# Patient Record
Sex: MALE | Race: WHITE | Employment: FULL TIME | URBAN - METROPOLITAN AREA
[De-identification: names, ages, dates, MRNs, and addresses within clinical notes are randomized per-mention and may not be internally consistent; named-entity substitution may affect disease eponyms.]

---

## 2017-01-04 ENCOUNTER — OFFICE VISIT (OUTPATIENT)
Dept: SURGERY | Age: 32
End: 2017-01-04

## 2017-01-04 ENCOUNTER — TELEPHONE (OUTPATIENT)
Dept: SURGERY | Age: 32
End: 2017-01-04

## 2017-01-04 VITALS
DIASTOLIC BLOOD PRESSURE: 66 MMHG | OXYGEN SATURATION: 98 % | TEMPERATURE: 98.4 F | RESPIRATION RATE: 14 BRPM | HEART RATE: 86 BPM | WEIGHT: 180 LBS | HEIGHT: 66 IN | BODY MASS INDEX: 28.93 KG/M2 | SYSTOLIC BLOOD PRESSURE: 124 MMHG

## 2017-01-04 DIAGNOSIS — R11.2 NAUSEA AND VOMITING, INTRACTABILITY OF VOMITING NOT SPECIFIED, UNSPECIFIED VOMITING TYPE: ICD-10-CM

## 2017-01-04 DIAGNOSIS — Z09 POSTOPERATIVE EXAMINATION: Primary | ICD-10-CM

## 2017-01-04 RX ORDER — ONDANSETRON 4 MG/1
4 TABLET, ORALLY DISINTEGRATING ORAL
Qty: 20 TAB | Refills: 0 | OUTPATIENT
Start: 2017-01-04 | End: 2019-11-21

## 2017-01-04 NOTE — PROGRESS NOTES
1. Have you been to the ER, urgent care clinic since your last visit? Hospitalized since your last visit?no    2. Have you seen or consulted any other health care providers outside of the [address] [place] [address] since your last visit? Include any pap smears or colon screening.  no

## 2017-01-04 NOTE — PROGRESS NOTES
Subjective:      Kassie Horan is a 27 y.o. male presents for postop care following bilateral inguinal hernia repair on 12/7/2016. Appetite is good. Eating a regular diet without difficulty. Bowel movements are regular. The patient is voiding without difficulty. 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 He got nauseated and vomited in front of his boss. He states that the pain started after he vomited. He complains of soreness on his right side. His boss wanted him to come in to be seen. His nausea and pain is much better now. Patient has an advanced directive: NO      Mr. Kareen De La Vega has a reminder for a \"due or due soon\" health maintenance. I have asked that he contact his primary care provider for follow-up on this health maintenance. Objective:     Visit Vitals    /66 (BP 1 Location: Left arm, BP Patient Position: Sitting)    Pulse 86    Temp 98.4 °F (36.9 °C) (Oral)    Resp 14    Ht 5' 6\" (1.676 m)    Wt 180 lb (81.6 kg)    SpO2 98%    BMI 29.05 kg/m2       General:  alert, cooperative, no distress   Abdomen: soft, bowel sounds active, non-tender   Incision:   healing well, no drainage, no erythema, no hernia, no seroma, no swelling, no dehiscence, incision well approximated     Assessment:     Nausea, vomitng and right groin pain. Plan:     Letter given to RTW 1/19/2017 without restrictions. Rest.   Follow up as needed.

## 2017-01-04 NOTE — MR AVS SNAPSHOT
Visit Information Date & Time Provider Department Dept. Phone Encounter #  
 1/4/2017 10:10 AM Matheus Green, [address] [facility] [phone] [id number] Upcoming Health Maintenance Date Due Pneumococcal 19-64 Medium Risk (1 of 1 - PPSV23) 10/11/2003 DTaP/Tdap/Td series (1 - Tdap) 10/11/2005 INFLUENZA AGE 9 TO ADULT 8/1/2016 Allergies as of 1/4/2017  Review Complete On: 1/4/2017 By: Matheus Green NP Severity Noted Reaction Type Reactions Toradol [Ketorolac Tromethamine] Low 12/06/2016   Intolerance Other (comments)  
 gastric reflux Current Immunizations  Never Reviewed No immunizations on file. Not reviewed this visit Vitals BP Pulse Temp Resp Height(growth percentile) Weight(growth percentile) 124/66 (BP 1 Location: Left arm, BP Patient Position: Sitting) 86 98.4 °F (36.9 °C) (Oral) 14 5' 6\" (1.676 m) 180 lb (81.6 kg) SpO2 BMI Smoking Status 98% 29.05 kg/m2 Current Some Day Smoker BMI and BSA Data Body Mass Index Body Surface Area 29.05 kg/m 2 1.95 m 2 Preferred Pharmacy Pharmacy Name Phone [facility] [address] [place] [address] [phone] Your Updated Medication List  
  
   
This list is accurate as of: 1/4/17  1:55 PM.  Always use your most recent med list.  
  
  
  
  
 ADDERALL 30 mg tablet Generic drug:  dextroamphetamine-amphetamine Take 10 mg by mouth daily. HYDROcodone-acetaminophen 5-325 mg per tablet Commonly known as:  NORCO  
1 to 2 tablets every 4 hours as needed for pain  
  
 ibuprofen 600 mg tablet Commonly known as:  MOTRIN Take 1 Tab by mouth every six (6) hours as needed for Pain. omeprazole 20 mg capsule Commonly known as:  PRILOSEC Take 20 mg by mouth daily. ondansetron 4 mg disintegrating tablet Commonly known as:  ZOFRAN ODT Take 1 Tab by mouth every eight (8) hours as needed for Nausea. Prescriptions Sent to Pharmacy Refills  
 ondansetron (ZOFRAN ODT) 4 mg disintegrating tablet 0 Sig: Take 1 Tab by mouth every eight (8) hours as needed for Nausea. Class: Normal  
 Pharmacy: [address] [facility] [place]  #: [phone] Route: Oral  
  
Patient Instructions Hernia Repair: What to Expect at Home Your Recovery You are likely to have pain for the next few days. You may also feel like you have the flu, and you may have a low fever and feel tired and nauseated. This is common. You should feel better after a few days and will probably feel much better in 7 days. For several weeks you may feel twinges or pulling in the hernia repair when you move. You may have some bruising on the scrotum and along the penis. This is normal. Men will need to wear a jockstrap or briefs, not boxers, for scrotal support for several days after a groin (inguinal) hernia repair. Unwired Nation[employer] bicycle shorts may provide good support. This care sheet gives you a general idea about how long it will take for you to recover. But each person recovers at a different pace. Follow the steps below to get better as quickly as possible. How can you care for yourself at home? Activity · Rest when you feel tired. Getting enough sleep will help you recover. · Try to walk each day. Start by walking a little more than you did the day before. Bit by bit, increase the amount you walk. Walking boosts blood flow and helps prevent pneumonia and constipation. · Avoid strenuous activities, such as biking, jogging, weight lifting, or aerobic exercise, until your doctor says it is okay. · Avoid lifting anything that would make you strain. This may include heavy grocery bags and milk containers, a heavy briefcase or backpack, cat litter or dog food bags, a vacuum , or a child.  
· You may drive when you are no longer taking pain medicine and can quickly move your foot from the gas pedal to the brake. You must also be able to sit comfortably for a long period of time, even if you do not plan to go far. You might get caught in traffic. · Most people are able to return to work within 1 to 2 weeks after surgery. · You may shower 24 to 48 hours after surgery, if your doctor okays it. Pat the cut (incision) dry. Do not take a bath for the first 2 weeks, or until your doctor tells you it is okay. · Your doctor will tell you when you can have sex again. Diet · You can eat your normal diet. If your stomach is upset, try bland, low-fat foods like plain rice, broiled chicken, toast, and yogurt. · Drink plenty of fluids (unless your doctor tells you not to). · You may notice that your bowel movements are not regular right after your surgery. This is common. Avoid constipation and straining with bowel movements. You may want to take a fiber supplement every day. If you have not had a bowel movement after a couple of days, ask your doctor about taking a mild laxative. Medicines · Your doctor will tell you if and when you can restart your medicines. He or she will also give you instructions about taking any new medicines. · If you take blood thinners, such as warfarin (Coumadin), clopidogrel (Plavix), or aspirin, be sure to talk to your doctor. He or she will tell you if and when to start taking those medicines again. Make sure that you understand exactly what your doctor wants you to do. · Be safe with medicines. Take pain medicines exactly as directed. ¨ If the doctor gave you a prescription medicine for pain, take it as prescribed. ¨ If you are not taking a prescription pain medicine, take an over-the-counter medicine such as acetaminophen (Tylenol), ibuprofen (Advil, Motrin), or naproxen (Aleve). Read and follow all instructions on the label.  
¨ Do not take two or more pain medicines at the same time unless the doctor told you to. Many pain medicines have acetaminophen, which is Tylenol. Too much acetaminophen (Tylenol) can be harmful. · If your doctor prescribed antibiotics, take them as directed. Do not stop taking them just because you feel better. You need to take the full course of antibiotics. · If you think your pain medicine is making you sick to your stomach: 
¨ Take your medicine after meals (unless your doctor has told you not to). ¨ Ask your doctor for a different pain medicine. Incision care · If you have strips of tape on the cut (incision) the doctor made, leave the tape on for a week or until it falls off. · If you have staples closing the cut, you will need to visit your doctor in 1 to 2 weeks to have them removed. · Wash the area daily with warm, soapy water and pat it dry. Follow-up care is a key part of your treatment and safety. Be sure to make and go to all appointments, and call your doctor if you are having problems. It's also a good idea to know your test results and keep a list of the medicines you take. When should you call for help? Call 911 anytime you think you may need emergency care. For example, call if: 
· You passed out (lost consciousness). · You have sudden chest pain and shortness of breath, or you cough up blood. · You have severe pain in your belly. Call your doctor now or seek immediate medical care if: 
· You are sick to your stomach and cannot keep fluids down. · You have signs of a blood clot, such as: 
¨ Pain in your calf, back of the knee, thigh, or groin. ¨ Redness and swelling in your leg or groin. · You have trouble passing urine or stool, especially if you have mild pain or swelling in your lower belly. · Bright red blood has soaked through the bandage over your incision. Watch closely for any changes in your health, and be sure to contact your doctor if: 
· Your swelling is getting worse. · Your swelling is not going down. Where can you learn more? Go to http://christopher-davida.info/. Enter E483 in the search box to learn more about \"Hernia Repair: What to Expect at Home. \" Current as of: August 9, 2016 Content Version: 11.1 © 6517-0837 [employer]. Care instructions adapted under license by [employer] (which disclaims liability or warranty for this information). If you have questions about a medical condition or this instruction, always ask your healthcare professional. [place] [address] any warranty or liability for your use of this information. Introducing [facility]! [facility] introduces AvePoint patient portal. Now you can access parts of your medical record, email your doctor's office, and request medication refills online. 1. In your internet browser, go to [email or web address] 2. Click on the First Time User? Click Here link in the Sign In box. You will see the New Member Sign Up page. 3. Enter your AvePoint Access Code exactly as it appears below. You will not need to use this code after youve completed the sign-up process. If you do not sign up before the expiration date, you must request a new code. · AvePoint Access Code: [email or web address] Expires: 2/27/2017  4:47 PM 
 
4. Enter the last four digits of your Social Security Number (xxxx) and Date of Birth (mm/dd/yyyy) as indicated and click Submit. You will be taken to the next sign-up page. 5. Create a AvePoint ID. This will be your AvePoint login ID and cannot be changed, so think of one that is secure and easy to remember. 6. Create a AvePoint password. You can change your password at any time. 7. Enter your Password Reset Question and Answer. This can be used at a later time if you forget your password. 8. Enter your e-mail address. You will receive e-mail notification when new information is available in [address] 9. Click Sign Up.  You can now view and download portions of your medical record. 10. Click the Download Summary menu link to download a portable copy of your medical information. If you have questions, please visit the Frequently Asked Questions section of the [employer] website. Remember, [employer] is NOT to be used for urgent needs. For medical emergencies, dial 911. Now available from your iPhone and Android! Please provide this summary of care documentation to your next provider. Your primary care clinician is listed as Yecenia Farris. If you have any questions after today's visit, please call [phone].

## 2017-01-04 NOTE — PATIENT INSTRUCTIONS
Hernia Repair: What to Expect at [address] are likely to have pain for the next few days. You may also feel like you have the flu, and you may have a low fever and feel tired and nauseated. This is common. You should feel better after a few days and will probably feel much better in 7 days. For several weeks you may feel twinges or pulling in the hernia repair when you move. You may have some bruising on the scrotum and along the penis. This is normal. Men will need to wear a jockstrap or briefs, not boxers, for scrotal support for several days after a groin (inguinal) hernia repair. [employer] bicycle shorts may provide good support. This care sheet gives you a general idea about how long it will take for you to recover. But each person recovers at a different pace. Follow the steps below to get better as quickly as possible. How can you care for yourself at home? Activity  · Rest when you feel tired. Getting enough sleep will help you recover. · Try to walk each day. Start by walking a little more than you did the day before. Bit by bit, increase the amount you walk. Walking boosts blood flow and helps prevent pneumonia and constipation. · Avoid strenuous activities, such as biking, jogging, weight lifting, or aerobic exercise, until your doctor says it is okay. · Avoid lifting anything that would make you strain. This may include heavy grocery bags and milk containers, a heavy briefcase or backpack, cat litter or dog food bags, a vacuum , or a child. · You may drive when you are no longer taking pain medicine and can quickly move your foot from the gas pedal to the brake. You must also be able to sit comfortably for a long period of time, even if you do not plan to go far. You might get caught in traffic. · Most people are able to return to work within 1 to 2 weeks after surgery. · You may shower 24 to 48 hours after surgery, if your doctor okays it. Pat the cut (incision) dry. Do not take a bath for the first 2 weeks, or until your doctor tells you it is okay. · Your doctor will tell you when you can have sex again. Diet  · You can eat your normal diet. If your stomach is upset, try bland, low-fat foods like plain rice, broiled chicken, toast, and yogurt. · Drink plenty of fluids (unless your doctor tells you not to). · You may notice that your bowel movements are not regular right after your surgery. This is common. Avoid constipation and straining with bowel movements. You may want to take a fiber supplement every day. If you have not had a bowel movement after a couple of days, ask your doctor about taking a mild laxative. Medicines  · Your doctor will tell you if and when you can restart your medicines. He or she will also give you instructions about taking any new medicines. · If you take blood thinners, such as warfarin (Coumadin), clopidogrel (Plavix), or aspirin, be sure to talk to your doctor. He or she will tell you if and when to start taking those medicines again. Make sure that you understand exactly what your doctor wants you to do. · Be safe with medicines. Take pain medicines exactly as directed. ¨ If the doctor gave you a prescription medicine for pain, take it as prescribed. ¨ If you are not taking a prescription pain medicine, take an over-the-counter medicine such as acetaminophen (Tylenol), ibuprofen (Advil, Motrin), or naproxen (Aleve). Read and follow all instructions on the label. ¨ Do not take two or more pain medicines at the same time unless the doctor told you to. Many pain medicines have acetaminophen, which is Tylenol. Too much acetaminophen (Tylenol) can be harmful. · If your doctor prescribed antibiotics, take them as directed. Do not stop taking them just because you feel better. You need to take the full course of antibiotics.   · If you think your pain medicine is making you sick to your stomach:  ¨ Take your medicine after meals (unless your doctor has told you not to). ¨ Ask your doctor for a different pain medicine. Incision care  · If you have strips of tape on the cut (incision) the doctor made, leave the tape on for a week or until it falls off. · If you have staples closing the cut, you will need to visit your doctor in 1 to 2 weeks to have them removed. · Wash the area daily with warm, soapy water and pat it dry. Follow-up care is a key part of your treatment and safety. Be sure to make and go to all appointments, and call your doctor if you are having problems. It's also a good idea to know your test results and keep a list of the medicines you take. When should you call for help? Call 911 anytime you think you may need emergency care. For example, call if:  · You passed out (lost consciousness). · You have sudden chest pain and shortness of breath, or you cough up blood. · You have severe pain in your belly. Call your doctor now or seek immediate medical care if:  · You are sick to your stomach and cannot keep fluids down. · You have signs of a blood clot, such as:  ¨ Pain in your calf, back of the knee, thigh, or groin. ¨ Redness and swelling in your leg or groin. · You have trouble passing urine or stool, especially if you have mild pain or swelling in your lower belly. · Bright red blood has soaked through the bandage over your incision. Watch closely for any changes in your health, and be sure to contact your doctor if:  · Your swelling is getting worse. · Your swelling is not going down. Where can you learn more? Go to http://christopher-davida.info/. Enter Z443 in the search box to learn more about \"Hernia Repair: What to Expect at Home. \"  Current as of: August 9, 2016  Content Version: 11.1  © 5562-9070 [employer]. Care instructions adapted under license by [employer] (which disclaims liability or warranty for this information).  If you have questions about a medical condition or this instruction, always ask your healthcare professional. [place] [address] any warranty or liability for your use of this information.

## 2017-01-04 NOTE — TELEPHONE ENCOUNTER
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 Also his boss needs a return back to work letter which states he has no restrictions.  Patient has appointment this morning at 10am with MELVI Boss

## 2017-01-09 ENCOUNTER — OFFICE VISIT (OUTPATIENT)
Dept: SURGERY | Age: 32
End: 2017-01-09

## 2017-01-09 VITALS
HEART RATE: 84 BPM | HEIGHT: 66 IN | BODY MASS INDEX: 30.05 KG/M2 | OXYGEN SATURATION: 99 % | TEMPERATURE: 97.8 F | WEIGHT: 187 LBS | DIASTOLIC BLOOD PRESSURE: 71 MMHG | RESPIRATION RATE: 15 BRPM | SYSTOLIC BLOOD PRESSURE: 131 MMHG

## 2017-01-09 DIAGNOSIS — Z09 POSTOPERATIVE EXAMINATION: ICD-10-CM

## 2017-01-09 DIAGNOSIS — R10.31 GROIN PAIN, RIGHT: Primary | ICD-10-CM

## 2017-01-09 RX ORDER — HYDROCODONE BITARTRATE AND ACETAMINOPHEN 5; 325 MG/1; MG/1
1 TABLET ORAL
Qty: 30 TAB | Refills: 0 | Status: SHIPPED | OUTPATIENT
Start: 2017-01-09 | End: 2019-11-21 | Stop reason: ALTCHOICE

## 2017-01-09 NOTE — PROGRESS NOTES
1. Have you been to the ER, urgent care clinic since your last visit? Hospitalized since your last visit?no    2. Have you seen or consulted any other health care providers outside of the [address] [place] [address] since your last visit? Include any pap smears or colon screening.  no

## 2017-01-09 NOTE — PROGRESS NOTES
Subjective:      Guzman Mack is a 27 y.o. male presents for postop care 5 weeks following lap BIHR. Patient states he went to work last week. He was stooped down working with heavy chains and developed sudden onset severe right groin pain, nausea and vomiting. He has been has progressive pain since. He states the pain is preventing him from sleeping. He has the urge to have a BM 'all the time.'  He also feels swollen in his right groin. The right testicle is tender. He denies nausea, constipation, bloating, and anorexia    Objective:     Visit Vitals    /71 (BP 1 Location: Left arm, BP Patient Position: Sitting)    Pulse 84    Temp 97.8 °F (36.6 °C) (Oral)    Resp 15    Ht 5' 6\" (1.676 m)    Wt 187 lb (84.8 kg)    SpO2 99%    BMI 30.18 kg/m2       General:  alert, cooperative, no distress, appears stated age   Abdomen: soft, bowel sounds active, non-tender   Incision:   healing well, no drainage, no erythema, no hernia, no seroma, no swelling, no dehiscence, incision well approximated     Assessment:     Unclear etiology for pain. Do not appreciate a recurrence on exam.  Agree with 2 more weeks off from work. Will get CT scan to assess for complications    Plan:     1.  Norco for the pain  2.  CT pelvis to assess   3.  follow up next week

## 2017-01-11 ENCOUNTER — HOSPITAL ENCOUNTER (OUTPATIENT)
Dept: CT IMAGING | Age: 32
Discharge: HOME | End: 2017-01-11
Attending: SURGERY
Payer: COMMERCIAL

## 2017-01-11 DIAGNOSIS — R10.31 GROIN PAIN, RIGHT: ICD-10-CM

## 2017-01-11 PROCEDURE — 74011636320 HC RX REV CODE- 636/320: Performed by: SURGERY

## 2017-01-11 PROCEDURE — 72193 CT PELVIS W/DYE: CPT

## 2017-01-11 RX ADMIN — IOPAMIDOL 100 ML: 612 INJECTION, SOLUTION INTRAVENOUS at 16:42

## 2017-01-12 ENCOUNTER — TELEPHONE (OUTPATIENT)
Dept: SURGERY | Age: 32
End: 2017-01-12

## 2017-01-12 NOTE — TELEPHONE ENCOUNTER
Patient called wanting the results form his test. Informed Dr Christa Jain said his results were normal. Patient understands, did not vice and further concerns

## 2017-01-17 ENCOUNTER — TELEPHONE (OUTPATIENT)
Dept: SURGERY | Age: 32
End: 2017-01-17

## 2017-01-17 ENCOUNTER — OFFICE VISIT (OUTPATIENT)
Dept: SURGERY | Age: 32
End: 2017-01-17

## 2017-01-17 VITALS
BODY MASS INDEX: 29.33 KG/M2 | SYSTOLIC BLOOD PRESSURE: 121 MMHG | OXYGEN SATURATION: 99 % | TEMPERATURE: 98.6 F | HEIGHT: 66 IN | WEIGHT: 182.5 LBS | DIASTOLIC BLOOD PRESSURE: 67 MMHG | RESPIRATION RATE: 15 BRPM | HEART RATE: 72 BPM

## 2017-01-17 DIAGNOSIS — Z09 POSTOPERATIVE EXAMINATION: Primary | ICD-10-CM

## 2017-01-17 NOTE — LETTER
NOTIFICATION RETURN TO WORK / SCHOOL 
 
1/17/2017 4:12 PM 
 
Mr. Kymberly Sommers [facility] [place] [phone] To Whom It May Concern: 
 
Kymberly Sommers is currently under the care of [employer] [facility]. He will return out of work until 1/23/17. He will return back to work 1/24/17 with no restrictions. If there are questions or concerns please have the patient contact our office. Sincerely, Tucker Strong MD

## 2017-01-17 NOTE — TELEPHONE ENCOUNTER
Called patient three times to inform patient he can come in today and be seen by DR Gia Herring.  Left message on voicemail

## 2017-01-17 NOTE — TELEPHONE ENCOUNTER
Patient called office states he is still in pain and feels he is unable to return back to work. His letter to return back to work is dated for tomorrow, which was already extended from the first letter to return back to work. Patient had ct of pelivc with contrast done on 1/9/17 which results were normal. Patient states he is laying in the bed at home. Informed him he needs to get up and start moving around being that his surgery was over a month ago. He states his job is not going to let him go back to work without taking a physical first which he feels he is not going to be able to pass. Informed patient he needs to take the physical and let them decide if he is able to perform his job duties. He would like to make appointment to see Dr Pb Pappas, appointment available in two weeks, patient refused to wait that long. Informed him if he is in that bad of pain and he feels its unbearable than he should go to the nearest emergency.

## 2017-01-17 NOTE — TELEPHONE ENCOUNTER
Spoke with patient after he spoke to Darian Mckay Covert nurse. Stated he was having pain, stated \"I injured myself at work pulling chains\" asked if he notified his employer of injury, patient hesitated stated \"yes\" asked what was employers response; stated \"they just sent me home\" asked if they recommended you to go to the ED since you injured yourself at work. Stated \"no\" Patient stated \" my job was already [facility] about me returning to work because my return to work letter did not say 'no restrictions' \". Reminded patient that a return to work letter was written for him on 1/5/17, with him being off work for rest and recovery from 1/4/17-1/18/17. Patient stated \"I did not give it to my job yet\" Appointment made for 1/19/17. Dr. Shama Vidal aware of issue.

## 2017-01-17 NOTE — PROGRESS NOTES
1. Have you been to the ER, urgent care clinic since your last visit? Hospitalized since your last visit?no    2. Have you seen or consulted any other health care providers outside of the [address] [place] [address] since your last visit? Include any pap smears or colon screening.  no

## 2017-01-17 NOTE — LETTER
NOTIFICATION RETURN TO WORK / SCHOOL 
 
1/17/2017 4:17 PM 
 
Mr. Nina Marcelino [facility] [place] [phone] To Whom It May Concern: 
 
Nina Marcelino is currently under the care of [facility]. He will be out of work until 1/23/17. He may return to work 1/24/17 with no restriction. If there are questions or concerns please have the patient contact our office. Sincerely, Ashok Hong MD

## 2017-01-19 NOTE — PROGRESS NOTES
Subjective:      Ilana Sanders LPN present during entire encounter     Nancy Ramos is a 27 y.o. male presents for postop care 6 weeks following BIHR. Patient comes in as an urgent overbooked add-on because he complained to the hospital administration that he wanted to be seen today for severe groin pain. Patient states he has right groin pain that he rates a 3/10 and is 'afraid to go back to work.'  He states he concerned he might 'hurt something' if he works. He thinks he was cleared to go back to work too soon. He request another week off work. He states that he was having severe groin pain at his 4 week follow up, when he was cleared to go back to work, but 'forgot' to mention it. He states he has been have severe pain since surgery. When confronted with medical records from 2015 describing the same severe groin pain he admits he has been complaining of the pain for some time. He states after surgery, the pain is the same but the location it is a little higher in his groin then before. Appetite is good. Eating a regular diet without difficulty. Bowel movements are regular. The patient is voiding without difficulty. Since is last visit, I received a letter from his insurance company describing their suspicion that the patient might be taking narcotics more often then prescribed    Objective:     Visit Vitals    /67 (BP 1 Location: Left arm, BP Patient Position: Sitting)    Pulse 72    Temp 98.6 °F (37 °C) (Oral)    Resp 15    Ht 5' 6\" (1.676 m)    Wt 182 lb 8 oz (82.8 kg)    SpO2 99%    BMI 29.46 kg/m2       General:  alert, cooperative, no distress, appears stated age   Abdomen: soft, bowel sounds active, no palpable inguinal hernias.   Point tenderness on the medial aspect of right pubic tubericle    Incision:   healing well, no drainage, no erythema, no hernia, no seroma, no swelling, no dehiscence, incision well approximated     Assessment:     27year old with chronic right groin pain. Extensive workup but another surgeon was negative for a hernia. Recent CT in the ER showed a small, fat containing RIH. This was repaired 6 weeks ago. He has changed his story about the postoperative pain. Initially, he claimed the pain resolved and then returned suddenly with activity at work. Now, he claims it has been persistent he just failed to let anyone know. He has no acute recurrence on exam or CT scan. Differential of postoperative groin pain after hernia includes nerve entrapment, mesh problems, seroma, hematoma, ischemia to the testicle and hernia recurrence. There is no clinical evidence of mesh problems, hernia recurrence, fluid collection or signs of ischemia. I doubt he has nerve entrapment because it is not a new pain after surgery. As a result, I think he is in no danger of injury if he returns to work. I think he would be best served with one more week off to recover further and then return with no restriction. As for suspicion of narcotic abuse, I doubt it. He has received narcotics here at each follow up and once from another physician during the time I have been involved with his care. So, I just don't see sufficient evidence. I did inform the patient of the concern and will not longer give him narcotics. Plan:     1. Rest one more week  2. Return to work next week with no restrictions  3.   PRN follow-up

## 2017-01-27 ENCOUNTER — TELEPHONE (OUTPATIENT)
Dept: SURGERY | Age: 32
End: 2017-01-27

## 2017-01-27 NOTE — TELEPHONE ENCOUNTER
Patient called office checking on his medical records his job was requesting. Told him the request was sent to [facility]  and # [phone] .  Patient took number and said he would call them

## 2017-07-20 ENCOUNTER — HOSPITAL ENCOUNTER (EMERGENCY)
Age: 32
Discharge: HOME | End: 2017-07-20
Attending: EMERGENCY MEDICINE
Payer: OTHER GOVERNMENT

## 2017-07-20 VITALS
TEMPERATURE: 98.1 F | DIASTOLIC BLOOD PRESSURE: 72 MMHG | RESPIRATION RATE: 20 BRPM | SYSTOLIC BLOOD PRESSURE: 118 MMHG | WEIGHT: 184.53 LBS | HEIGHT: 66 IN | BODY MASS INDEX: 29.66 KG/M2 | HEART RATE: 80 BPM | OXYGEN SATURATION: 96 %

## 2017-07-20 DIAGNOSIS — T67.2XXA HEAT CRAMP, INITIAL ENCOUNTER: Primary | ICD-10-CM

## 2017-07-20 DIAGNOSIS — E86.0 DEHYDRATION: ICD-10-CM

## 2017-07-20 LAB
ALBUMIN SERPL BCP-MCNC: 4.7 G/DL (ref 3.5–5)
ALBUMIN/GLOB SERPL: 1.3 {RATIO} (ref 1.1–2.2)
ALP SERPL-CCNC: 67 U/L (ref 45–117)
ALT SERPL-CCNC: 37 U/L (ref 12–78)
ANION GAP BLD CALC-SCNC: 14 MMOL/L (ref 5–15)
APPEARANCE UR: CLEAR
AST SERPL W P-5'-P-CCNC: 19 U/L (ref 15–37)
BASOPHILS # BLD AUTO: 0.1 K/UL (ref 0–0.1)
BASOPHILS # BLD: 0 % (ref 0–1)
BILIRUB SERPL-MCNC: 0.4 MG/DL (ref 0.2–1)
BILIRUB UR QL: NEGATIVE
BUN SERPL-MCNC: 20 MG/DL (ref 6–20)
BUN/CREAT SERPL: 17 (ref 12–20)
CALCIUM SERPL-MCNC: 9.6 MG/DL (ref 8.5–10.1)
CHLORIDE SERPL-SCNC: 104 MMOL/L (ref 97–108)
CK SERPL-CCNC: 131 U/L (ref 39–308)
CO2 SERPL-SCNC: 24 MMOL/L (ref 21–32)
COLOR UR: NORMAL
CREAT SERPL-MCNC: 1.19 MG/DL (ref 0.7–1.3)
DIFFERENTIAL METHOD BLD: ABNORMAL
EOSINOPHIL # BLD: 0.2 K/UL (ref 0–0.4)
EOSINOPHIL NFR BLD: 1 % (ref 0–7)
ERYTHROCYTE [DISTWIDTH] IN BLOOD BY AUTOMATED COUNT: 12.2 % (ref 11.5–14.5)
GLOBULIN SER CALC-MCNC: 3.5 G/DL (ref 2–4)
GLUCOSE SERPL-MCNC: 87 MG/DL (ref 65–100)
GLUCOSE UR STRIP.AUTO-MCNC: NEGATIVE MG/DL
HCT VFR BLD AUTO: 46.5 % (ref 36.6–50.3)
HGB BLD-MCNC: 16.3 G/DL (ref 12.1–17)
HGB UR QL STRIP: NEGATIVE
KETONES UR QL STRIP.AUTO: NEGATIVE MG/DL
LEUKOCYTE ESTERASE UR QL STRIP.AUTO: NEGATIVE
LYMPHOCYTES # BLD AUTO: 33 % (ref 12–49)
LYMPHOCYTES # BLD: 3.7 K/UL
MAGNESIUM SERPL-MCNC: 2.2 MG/DL (ref 1.6–2.4)
MCH RBC QN AUTO: 31 PG (ref 26–34)
MCHC RBC AUTO-ENTMCNC: 35.1 G/DL (ref 30–36.5)
MCV RBC AUTO: 88.6 FL (ref 80–99)
MONOCYTES # BLD: 1 K/UL (ref 0–1)
MONOCYTES NFR BLD AUTO: 9 % (ref 5–13)
NEUTS SEG # BLD: 6.4 K/UL (ref 1.8–8)
NEUTS SEG NFR BLD AUTO: 57 % (ref 32–75)
NITRITE UR QL STRIP.AUTO: NEGATIVE
PH UR STRIP: 6 [PH] (ref 5–8)
PHOSPHATE SERPL-MCNC: 3.5 MG/DL (ref 2.6–4.7)
PLATELET # BLD AUTO: 248 K/UL (ref 150–400)
POTASSIUM SERPL-SCNC: 3.8 MMOL/L (ref 3.5–5.1)
PROT SERPL-MCNC: 8.2 G/DL (ref 6.4–8.2)
PROT UR STRIP-MCNC: NEGATIVE MG/DL
RBC # BLD AUTO: 5.25 M/UL (ref 4.1–5.7)
SODIUM SERPL-SCNC: 142 MMOL/L (ref 136–145)
SP GR UR REFRACTOMETRY: 1.02 (ref 1–1.03)
UROBILINOGEN UR QL STRIP.AUTO: 0.2 EU/DL (ref 0.2–1)
WBC # BLD AUTO: 11.3 K/UL (ref 4.1–11.1)

## 2017-07-20 PROCEDURE — 83735 ASSAY OF MAGNESIUM: CPT | Performed by: EMERGENCY MEDICINE

## 2017-07-20 PROCEDURE — 82550 ASSAY OF CK (CPK): CPT | Performed by: EMERGENCY MEDICINE

## 2017-07-20 PROCEDURE — 74011250636 HC RX REV CODE- 250/636: Performed by: EMERGENCY MEDICINE

## 2017-07-20 PROCEDURE — 96361 HYDRATE IV INFUSION ADD-ON: CPT

## 2017-07-20 PROCEDURE — 93005 ELECTROCARDIOGRAM TRACING: CPT

## 2017-07-20 PROCEDURE — 85025 COMPLETE CBC W/AUTO DIFF WBC: CPT | Performed by: EMERGENCY MEDICINE

## 2017-07-20 PROCEDURE — 96374 THER/PROPH/DIAG INJ IV PUSH: CPT

## 2017-07-20 PROCEDURE — 84100 ASSAY OF PHOSPHORUS: CPT | Performed by: EMERGENCY MEDICINE

## 2017-07-20 PROCEDURE — 80053 COMPREHEN METABOLIC PANEL: CPT | Performed by: EMERGENCY MEDICINE

## 2017-07-20 PROCEDURE — 99285 EMERGENCY DEPT VISIT HI MDM: CPT

## 2017-07-20 PROCEDURE — 81003 URINALYSIS AUTO W/O SCOPE: CPT | Performed by: EMERGENCY MEDICINE

## 2017-07-20 PROCEDURE — 36415 COLL VENOUS BLD VENIPUNCTURE: CPT | Performed by: EMERGENCY MEDICINE

## 2017-07-20 RX ORDER — ONDANSETRON 2 MG/ML
8 INJECTION INTRAMUSCULAR; INTRAVENOUS
Status: COMPLETED | OUTPATIENT
Start: 2017-07-20 | End: 2017-07-20

## 2017-07-20 RX ADMIN — SODIUM CHLORIDE 1000 ML: 900 INJECTION, SOLUTION INTRAVENOUS at 20:00

## 2017-07-20 RX ADMIN — ONDANSETRON 8 MG: 2 INJECTION INTRAMUSCULAR; INTRAVENOUS at 19:20

## 2017-07-20 RX ADMIN — SODIUM CHLORIDE 2000 ML: 900 INJECTION, SOLUTION INTRAVENOUS at 19:17

## 2017-07-20 NOTE — ED TRIAGE NOTES
Triage note:  Pt drove self to ER with c/o N/V and body cramps. Pt stated he feels like he is dehydrated because he has been working outside in the heat today. Last vomited 30 minutes ago.

## 2017-07-20 NOTE — LETTER
[facility] EMERGENCY DEPT 
[address] [place] [address] [id number] 
[phone] Work/School Note Date: 7/20/2017 To Whom It May concern: 
 
Jerica Davis was seen and treated today in the emergency room by the following provider(s): 
Attending Provider: Russ Ramsay MD. Jerica Davis may return to work on [employer].  
 
Sincerely, 
 
 
 
 
Russ Ramsay MD

## 2017-07-21 LAB
ATRIAL RATE: 66 BPM
CALCULATED P AXIS, ECG09: 22 DEGREES
CALCULATED R AXIS, ECG10: 50 DEGREES
CALCULATED T AXIS, ECG11: 46 DEGREES
DIAGNOSIS, 93000: NORMAL
P-R INTERVAL, ECG05: 114 MS
Q-T INTERVAL, ECG07: 386 MS
QRS DURATION, ECG06: 90 MS
QTC CALCULATION (BEZET), ECG08: 404 MS
VENTRICULAR RATE, ECG03: 66 BPM

## 2017-07-21 NOTE — ED PROVIDER NOTES
HPI Comments: The patient presents to the ED with concern for heat exhaustion. He is a , but moves heavy equipment. He estimates that he was outside today for 6-8 hours. He began to feel overheated at noon (7 hrs ago). Symptoms increased he he stopped sweating. He has generalized headache, diffuse muscle cramping, and feels dizzy. He has vomited a few times. Pain is 5/10. No meds have been taken. He drove himself to the ED. 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 He was hospitalized for 3 days and on modified duty for 3 months. Patient is a 27 y.o. male presenting with vomiting. The history is provided by the patient. Vomiting    Associated symptoms include myalgias. Pertinent negatives include no fever, no abdominal pain, no diarrhea, no headaches, no cough and no headaches. Past Medical History:   Diagnosis Date    ADHD (attention deficit hyperactivity disorder)     Depression     Dyspepsia and other specified disorders of function of stomach     Epididymitis     Gastrointestinal disorder     GERD    Heat stroke     Musculoskeletal disorder        Past Surgical History:   Procedure Laterality Date    HX HEENT      right eye     HX VASECTOMY           Family History:   Problem Relation Age of Onset    Hypertension Father     High Cholesterol Father     Diabetes Maternal Grandfather     Heart Disease Maternal Grandfather     Heart Attack Maternal Grandfather     Stroke Maternal Grandfather     Heart Attack Paternal Grandfather     Stroke Paternal Grandfather        Social History     Social History    Marital status:      Spouse name: N/A    Number of children: N/A    Years of education: N/A     Occupational History    Not on file.      Social History Main Topics    Smoking status: Current Some Day Smoker     Packs/day: 0.50    Smokeless tobacco: Never Used      Comment: 6 MONTHS AGO 1PPD X 15 YEARS    Alcohol use Yes      Comment: occassionally    Drug use: Yes     Special: Marijuana    Sexual activity: Yes     Partners: Female     Other Topics Concern    Not on file     Social History Narrative         ALLERGIES: Toradol [ketorolac tromethamine]    Review of Systems   Constitutional: Negative for appetite change and fever. HENT: Negative for congestion, nosebleeds and sore throat. Eyes: Negative for discharge. Respiratory: Negative for cough and shortness of breath. Cardiovascular: Negative for chest pain. Gastrointestinal: Positive for nausea and vomiting. Negative for abdominal pain and diarrhea. Genitourinary: Negative for dysuria. Musculoskeletal: Positive for myalgias. Skin: Negative for rash. Neurological: Positive for dizziness. Negative for weakness and headaches. Hematological: Negative for adenopathy. Psychiatric/Behavioral: Negative. All other systems reviewed and are negative. Vitals:    07/20/17 1915 07/20/17 1930 07/20/17 1945 07/20/17 2015   BP: 131/64 136/78 145/75 118/72   Pulse:       Resp: 16 16 20    Temp:       SpO2: 99% 100% 94% 96%   Weight:       Height:                Physical Exam   Constitutional: He is oriented to person, place, and time. He appears well-developed and well-nourished. HENT:   Head: Normocephalic and atraumatic. Mouth/Throat: Oropharynx is clear and moist.   Eyes: Conjunctivae are normal.   Neck: Normal range of motion. Neck supple. Cardiovascular: Normal rate, regular rhythm and normal heart sounds. Pulmonary/Chest: Effort normal and breath sounds normal.   Abdominal: Soft. Bowel sounds are normal. There is no tenderness. Musculoskeletal: Normal range of motion. He exhibits no edema or tenderness. Neurological: He is alert and oriented to person, place, and time. Skin: Skin is warm and dry. Psychiatric: He has a normal mood and affect. His behavior is normal.   Nursing note and vitals reviewed.        MDM  ED Course       Procedures    ED EKG interpretation:  Rhythm: normal sinus rhythm; and regular . Rate (approx.): 66; Axis: normal; P wave: normal; QRS interval: normal ; ST/T wave: normal; This EKG was interpreted by Elvia Craig MD,ED Provider. A/P:  1. Heat cramps - symptoms resolved with IVF. 2. Dehydration - IVF given. Patient pain free at discharge. Patient's results have been reviewed with them. Patient and/or family have verbally conveyed their understanding and agreement of the patient's signs, symptoms, diagnosis, treatment and prognosis and additionally agree to follow up as recommended or return to the Emergency Room should their condition change prior to follow-up. Discharge instructions have also been provided to the patient with some educational information regarding their diagnosis as well a list of reasons why they would want to return to the ER prior to their follow-up appointment should their condition change.

## 2017-07-21 NOTE — DISCHARGE INSTRUCTIONS
We hope that we have addressed all of your medical concerns. The examination and treatment you received in the Emergency Department were for an emergent problem and were not intended as complete care. It is important that you follow up with your healthcare provider(s) for ongoing care. If your symptoms worsen or do not improve as expected, and you are unable to reach your usual health care provider(s), you should return to the Emergency Department. Today's healthcare is undergoing tremendous change, and patient satisfaction surveys are one of the many tools to assess the quality of medical care. You may receive a survey from the [employer] regarding your experience in the Emergency Department. I hope that your experience has been completely positive, particularly the medical care that I provided. As such, please participate in the survey; anything less than excellent does not meet my expectations or intentions. [facility] and 8 [facility] participate in nationally recognized quality of care measures. If your blood pressure is greater than 120/80, as reported below, we urge that you seek medical care to address the potential of high blood pressure, commonly known as hypertension. Hypertension can be hereditary or can be caused by certain medical conditions, pain, stress, or \"white coat syndrome. \"       Please make an appointment with your health care provider(s) for follow up of your Emergency Department visit. VITALS:   Patient Vitals for the past 8 hrs:   Temp Pulse Resp BP SpO2   07/20/17 2015 - - - 118/72 96 %   07/20/17 1945 - - 20 145/75 94 %   07/20/17 1930 - - 16 136/78 100 %   07/20/17 1915 - - 16 131/64 99 %   07/20/17 1901 98.1 °F (36.7 °C) 80 20 153/86 98 %          Thank you for allowing us to provide you with medical care today. We realize that you have many choices for your emergency care needs.   Please choose us in the future for any continued health care needs. Gabreile Rodirguez, [facility].   Office: [phone]            Recent Results (from the past 24 hour(s))   CBC WITH AUTOMATED DIFF    Collection Time: 07/20/17  7:15 PM   Result Value Ref Range    WBC 11.3 (H) 4.1 - 11.1 K/uL    RBC 5.25 4. 10 - 5.70 M/uL    HGB 16.3 12.1 - 17.0 g/dL    HCT 46.5 36.6 - 50.3 %    MCV 88.6 80.0 - 99.0 FL    MCH 31.0 26.0 - 34.0 PG    MCHC 35.1 30.0 - 36.5 g/dL    RDW 12.2 11.5 - 14.5 %    PLATELET 922 302 - 397 K/uL    NEUTROPHILS 57 32 - 75 %    LYMPHOCYTES 33 12 - 49 %    MONOCYTES 9 5 - 13 %    EOSINOPHILS 1 0 - 7 %    BASOPHILS 0 0 - 1 %    ABS. NEUTROPHILS 6.4 1.8 - 8.0 K/UL    ABS. LYMPHOCYTES 3.7 K/UL    ABS. MONOCYTES 1.0 0.0 - 1.0 K/UL    ABS. EOSINOPHILS 0.2 0.0 - 0.4 K/UL    ABS. BASOPHILS 0.1 0.0 - 0.1 K/UL    DF AUTOMATED     METABOLIC PANEL, COMPREHENSIVE    Collection Time: 07/20/17  7:15 PM   Result Value Ref Range    Sodium 142 136 - 145 mmol/L    Potassium 3.8 3.5 - 5.1 mmol/L    Chloride 104 97 - 108 mmol/L    CO2 24 21 - 32 mmol/L    Anion gap 14 5 - 15 mmol/L    Glucose 87 65 - 100 mg/dL    BUN 20 6 - 20 MG/DL    Creatinine 1.19 0.70 - 1.30 MG/DL    BUN/Creatinine ratio 17 12 - 20      GFR est AA >60 >60 ml/min/1.73m2    GFR est non-AA >60 >60 ml/min/1.73m2    Calcium 9.6 8.5 - 10.1 MG/DL    Bilirubin, total 0.4 0.2 - 1.0 MG/DL    ALT (SGPT) 37 12 - 78 U/L    AST (SGOT) 19 15 - 37 U/L    Alk.  phosphatase 67 45 - 117 U/L    Protein, total 8.2 6.4 - 8.2 g/dL    Albumin 4.7 3.5 - 5.0 g/dL    Globulin 3.5 2.0 - 4.0 g/dL    A-G Ratio 1.3 1.1 - 2.2     MAGNESIUM    Collection Time: 07/20/17  7:15 PM   Result Value Ref Range    Magnesium 2.2 1.6 - 2.4 mg/dL   PHOSPHORUS    Collection Time: 07/20/17  7:15 PM   Result Value Ref Range    Phosphorus 3.5 2.6 - 4.7 MG/DL   CK    Collection Time: 07/20/17  7:15 PM   Result Value Ref Range     39 - 308 U/L   URINALYSIS W/ RFLX MICROSCOPIC    Collection Time: 07/20/17  8:31 PM   Result Value Ref Range    Color YELLOW/STRAW      Appearance CLEAR CLEAR      Specific gravity 1.025 1.003 - 1.030      pH (UA) 6.0 5.0 - 8.0      Protein NEGATIVE  NEG mg/dL    Glucose NEGATIVE  NEG mg/dL    Ketone NEGATIVE  NEG mg/dL    Bilirubin NEGATIVE  NEG      Blood NEGATIVE  NEG      Urobilinogen 0.2 0.2 - 1.0 EU/dL    Nitrites NEGATIVE  NEG      Leukocyte Esterase NEGATIVE  NEG              Dehydration: Care Instructions  Your Care Instructions  Dehydration happens when your body loses too much fluid. This might happen when you do not drink enough water or you lose large amounts of fluids from your body because of diarrhea, vomiting, or sweating. Severe dehydration can be life-threatening. Water and minerals called electrolytes help put your body fluids back in balance. Learn the early signs of fluid loss, and drink more fluids to prevent dehydration. Follow-up care is a key part of your treatment and safety. Be sure to make and go to all appointments, and call your doctor if you are having problems. It's also a good idea to know your test results and keep a list of the medicines you take. How can you care for yourself at home? · To prevent dehydration, drink plenty of fluids, enough so that your urine is light yellow or clear like water. Choose water and other caffeine-free clear liquids until you feel better. If you have kidney, heart, or liver disease and have to limit fluids, talk with your doctor before you increase the amount of fluids you drink. · If you do not feel like eating or drinking, try taking small sips of water, sports drinks, or other rehydration drinks. · Get plenty of rest.  To prevent dehydration  · Add more fluids to your diet and daily routine, unless your doctor has told you not to. · During hot weather, drink more fluids. Drink even more fluids if you exercise a lot.  Stay away from drinks with alcohol or caffeine. · Watch for the symptoms of dehydration. These include:  ¨ A dry, sticky mouth. ¨ Dark yellow urine, and not much of it. ¨ Dry and sunken eyes. ¨ Feeling very tired. · Learn what problems can lead to dehydration. These include:  ¨ Diarrhea, fever, and vomiting. ¨ Any illness with a fever, such as pneumonia or the flu. ¨ Activities that cause heavy sweating, such as endurance races and heavy outdoor work in hot or humid weather. ¨ Alcohol or drug abuse or withdrawal.  ¨ Certain medicines, such as cold and allergy pills (antihistamines), diet pills (diuretics), and laxatives. ¨ Certain diseases, such as diabetes, cancer, and heart or kidney disease. When should you call for help? Call 911 anytime you think you may need emergency care. For example, call if:  · You passed out (lost consciousness). Call your doctor now or seek immediate medical care if:  · You are confused and cannot think clearly. · You are dizzy or lightheaded, or you feel like you may faint. · You have signs of needing more fluids. You have sunken eyes and a dry mouth, and you pass only a little dark urine. · You cannot keep fluids down. Watch closely for changes in your health, and be sure to contact your doctor if:  · You are not making tears. · Your skin is very dry and sags slowly back into place after you pinch it. · Your mouth and eyes are very dry. Where can you learn more? Go to http://christopher-davida.info/. Enter W787 in the search box to learn more about \"Dehydration: Care Instructions. \"  Current as of: March 20, 2017  Content Version: 11.3  © 5600-5534 [employer]. Care instructions adapted under license by [employer] (which disclaims liability or warranty for this information).  If you have questions about a medical condition or this instruction, always ask your healthcare professional. [place] [address] any warranty or liability for your use of this information. Heat Exhaustion: Care Instructions  Your Care Instructions  Heat exhaustion occurs when you are hot, sweat a lot, and do not drink enough to replace the lost fluids. Heat exhaustion is not the same as heatstroke, which is much more serious. Heatstroke can lead to problems with many different organs and can be life-threatening. After medical care for heat exhaustion, you will need to limit your activities and take good care of your body while it recovers. Follow-up care is a key part of your treatment and safety. Be sure to make and go to all appointments, and call your doctor if you are having problems. Its also a good idea to know your test results and keep a list of the medicines you take. How can you care for yourself at home? · Reduce your activities, and get plenty of rest. Your doctor will give you instructions on when you can resume your normal schedule. · Stay in a cool room for at least the next 24 hours. · Drink rehydration drinks, juices, and water to replace fluids. Drinks such as sports drinks that contain electrolytes work best, because they have salt and minerals. You need salt and minerals as well as water. You are drinking enough fluids when your urine is normal in color (light yellow or clear), and you are urinating every 2 to 4 hours. If you have kidney, heart, or liver disease and have to limit fluids or salt, talk with your doctor before you increase your fluid or salt intake. · Avoid drinks that have caffeine or alcohol. To prevent heat exhaustion  · Drink plenty of fluids, enough so that your urine is light yellow or clear like water. If you have kidney, heart, or liver disease and have to limit fluids, talk with your doctor before you increase the amount of fluids you drink. · Drink plenty of water before, during, and after you are active. This is very important when it is hot out and when you do intense exercise.   · During hot weather, wear light-colored clothing that fits loosely and a hat with a brim to reflect the sun. · Limit or avoid strenuous activity during hot or humid weather, especially during the hottest part of the day (10 a.m. to 4 p.m.). Heat exhaustion and heatstroke usually develop when you are working or exercising in hot weather. Humidity makes hot weather even more dangerous. · Cars can get very hot inside. Open the windows or turn on the air conditioning before you get in and close the doors. · Try to stay cool during hot weather. If your home is not air-conditioned, seek an air-conditioned place. That could be in [employer], a neighborhood café, or a friend's home. [place] yourself with a cool mist. Take a cool shower, bath, or sponge bath. · Be aware that some medicines, such as major tranquilizers, can raise the risk of heat exhaustion. Ask your doctor whether any medicine you take raises your chance of getting heat exhaustion. When should you call for help? Call 911 anytime you think you may need emergency care. For example, call if:  · You feel very hot and:  ¨ You have a seizure. ¨ You feel confused. ¨ Your skin is red, hot, and dry. ¨ You passed out (lost consciousness). Call your doctor now or seek immediate medical care if:  · You cannot keep fluids down. · After returning to your normal activities, you have symptoms of heat exhaustion, such as sweating a lot, fatigue, dizziness, or nausea. Watch closely for changes in your health, and be sure to contact your doctor if:  · You do not get better as expected. Where can you learn more? Go to http://christopher-davida.info/. Enter S222 in the search box to learn more about \"Heat Exhaustion: Care Instructions. \"  Current as of: March 20, 2017  Content Version: 11.3  © 5879-4068 [employer]. Care instructions adapted under license by [employer] (which disclaims liability or warranty for this information).  If you have questions about a medical condition or this instruction, always ask your healthcare professional. [address] any warranty or liability for your use of this information. Muscle Cramps: Care Instructions  Your Care Instructions  A muscle cramp is when a muscle tightens up suddenly. It often occurs in the legs. A muscle cramp is also called a charley horse. Muscle cramps usually last less than a minute. However, the pain may last for several minutes. Leg cramps that occur at night may wake you up. Heavy exercise, dehydration, and being overweight can increase your risk of getting cramps. An imbalance of certain chemicals in your blood, called electrolytes, can also lead to muscle cramps. Pregnant women sometimes get muscle cramps during sleep. Muscle cramps can be treated by stretching and massaging the muscle. If cramps keep coming back, your doctor may prescribe medicine that relaxes your muscles. Follow-up care is a key part of your treatment and safety. Be sure to make and go to all appointments, and call your doctor if you are having problems. Its also a good idea to know your test results and keep a list of the medicines you take. How can you care for yourself at home? · Drink plenty of fluids to prevent dehydration, enough so that your urine is light yellow or clear like water. Choose water and other caffeine-free clear liquids until you feel better. If you have kidney, heart, or liver disease and have to limit fluids, talk with your doctor before you increase the amount of fluids you drink. · Stretch your muscles every day, especially before and after exercise and at bedtime. Regular stretching can relax your muscles and may prevent cramps. · Do not suddenly increase the amount of exercise you get. Increase your exercise a little each week. · When you get a cramp, stretch and massage the muscle. You can also take a warm shower or bath to relax the muscle.  A heating pad placed on the muscle can also help. · Take a daily multivitamin supplement. · Take an over-the-counter pain medicine, such as acetaminophen (Tylenol), ibuprofen (Advil, Motrin), or naproxen (Aleve) for cramps. Read and follow all instructions on the label. · Take your medicines exactly as prescribed. Call your doctor if you have any problems with your medicine. When should you call for help? Watch closely for changes in your health, and be sure to contact your doctor if:  · You get muscle cramps often that do not go away after home treatment. · Your muscle cramps often wake you up at night. · You do not get better as expected. Where can you learn more? Go to http://christopher-davida.info/. Enter N549 in the search box to learn more about \"Muscle Cramps: Care Instructions. \"  Current as of: March 21, 2017  Content Version: 11.3  © 4411-4682 [employer]. Care instructions adapted under license by [employer] (which disclaims liability or warranty for this information). If you have questions about a medical condition or this instruction, always ask your healthcare professional. [place] [address] any warranty or liability for your use of this information. Animated Speech Activation    Thank you for requesting access to Animated Speech. Please follow the instructions below to securely access and download your online medical record. Animated Speech allows you to send messages to your doctor, view your test results, renew your prescriptions, schedule appointments, and more. How Do I Sign Up? 1. In your internet browser, go to [email or web address]  2. Click on the First Time User? Click Here link in the Sign In box. You will be redirect to the New Member Sign Up page. 3. Enter your Animated Speech Access Code exactly as it appears below. You will not need to use this code after youve completed the sign-up process. If you do not sign up before the expiration date, you must request a new code.     Animated Speech Access Code: [email or web address]  Expires: 10/7/2017  2:09 PM (This is the date your [email or web address] access code will )    4. Enter the last four digits of your Social Security Number (xxxx) and Date of Birth (mm/dd/yyyy) as indicated and click Submit. You will be taken to the next sign-up page. 5. Create a [email or web address] ID. This will be your [email or web address] login ID and cannot be changed, so think of one that is secure and easy to remember. 6. Create a [email or web address] password. You can change your password at any time. 7. Enter your Password Reset Question and Answer. This can be used at a later time if you forget your password. 8. Enter your e-mail address. You will receive e-mail notification when new information is available in [address] 9. Click Sign Up. You can now view and download portions of your medical record. 10. Click the Download Summary menu link to download a portable copy of your medical information. Additional Information    If you have questions, please visit the Frequently Asked Questions section of the [email or web address] website at [email or web address] Remember, [email or web address] is NOT to be used for urgent needs. For medical emergencies, dial 911.

## 2019-09-17 ENCOUNTER — APPOINTMENT (OUTPATIENT)
Dept: GENERAL RADIOLOGY | Age: 34
End: 2019-09-17
Attending: PHYSICIAN ASSISTANT
Payer: SELF-PAY

## 2019-09-17 ENCOUNTER — HOSPITAL ENCOUNTER (EMERGENCY)
Age: 34
Discharge: HOME | End: 2019-09-17
Attending: STUDENT IN AN ORGANIZED HEALTH CARE EDUCATION/TRAINING PROGRAM
Payer: SELF-PAY

## 2019-09-17 VITALS
TEMPERATURE: 98.2 F | HEART RATE: 66 BPM | SYSTOLIC BLOOD PRESSURE: 123 MMHG | BODY MASS INDEX: 31.46 KG/M2 | WEIGHT: 195.77 LBS | OXYGEN SATURATION: 98 % | DIASTOLIC BLOOD PRESSURE: 68 MMHG | HEIGHT: 66 IN | RESPIRATION RATE: 18 BRPM

## 2019-09-17 DIAGNOSIS — S76.012A STRAIN OF FLEXOR MUSCLE OF LEFT HIP, INITIAL ENCOUNTER: ICD-10-CM
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
## 2019-09-17 PROCEDURE — 73502 X-RAY EXAM HIP UNI 2-3 VIEWS: CPT

## 2019-09-17 PROCEDURE — 99284 EMERGENCY DEPT VISIT MOD MDM: CPT

## 2019-09-17 RX ORDER — DICLOFENAC SODIUM 75 MG/1
75 TABLET, DELAYED RELEASE ORAL 2 TIMES DAILY
Qty: 20 TAB | Refills: 0 | Status: SHIPPED | OUTPATIENT
Start: 2019-09-17 | End: 2019-09-27

## 2019-09-17 RX ORDER — BACLOFEN 10 MG/1
TABLET ORAL AS NEEDED
COMMUNITY

## 2019-09-17 RX ORDER — ESCITALOPRAM OXALATE 20 MG/1
30 TABLET ORAL DAILY
COMMUNITY

## 2019-09-17 RX ORDER — LIDOCAINE 40 MG/G
CREAM TOPICAL
Qty: 15 G | Refills: 0 | Status: SHIPPED | OUTPATIENT
Start: 2019-09-17

## 2019-09-17 RX ORDER — DICLOFENAC SODIUM 75 MG/1
TABLET, DELAYED RELEASE ORAL AS NEEDED
COMMUNITY
End: 2019-09-17
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
## 2019-09-17 NOTE — LETTER
[facility] EMERGENCY DEPT 
[address] [facility] [place] [address] 
[phone] Work/School Note Date: 9/17/2019 To Whom It May concern: 
 
Lashon Pearson was seen and treated today in the emergency room by the following provider(s): 
Attending Provider: Jaxon Peralta MD 
Physician Assistant: Jose Panda, [address] may return to work on 9/19/19. Sincerely, JYOTHI James
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
## 2019-11-21 ENCOUNTER — HOSPITAL ENCOUNTER (EMERGENCY)
Age: 34
Discharge: HOME | End: 2019-11-21
Attending: STUDENT IN AN ORGANIZED HEALTH CARE EDUCATION/TRAINING PROGRAM
Payer: OTHER GOVERNMENT

## 2019-11-21 ENCOUNTER — APPOINTMENT (OUTPATIENT)
Dept: ULTRASOUND IMAGING | Age: 34
End: 2019-11-21
Attending: PHYSICIAN ASSISTANT
Payer: OTHER GOVERNMENT

## 2019-11-21 VITALS
SYSTOLIC BLOOD PRESSURE: 144 MMHG | DIASTOLIC BLOOD PRESSURE: 83 MMHG | OXYGEN SATURATION: 97 % | WEIGHT: 203.48 LBS | HEART RATE: 60 BPM | HEIGHT: 66 IN | TEMPERATURE: 98.3 F | BODY MASS INDEX: 32.7 KG/M2 | RESPIRATION RATE: 16 BRPM

## 2019-11-21 DIAGNOSIS — R11.2 NAUSEA AND VOMITING, INTRACTABILITY OF VOMITING NOT SPECIFIED, UNSPECIFIED VOMITING TYPE: ICD-10-CM

## 2019-11-21 DIAGNOSIS — K76.0 HEPATIC STEATOSIS: ICD-10-CM

## 2019-11-21 DIAGNOSIS — R10.11 RUQ ABDOMINAL PAIN: Primary | ICD-10-CM

## 2019-11-21 LAB
ALBUMIN SERPL-MCNC: 4.2 G/DL (ref 3.5–5)
ALBUMIN/GLOB SERPL: 1.2 {RATIO} (ref 1.1–2.2)
ALP SERPL-CCNC: 55 U/L (ref 45–117)
ALT SERPL-CCNC: 65 U/L (ref 12–78)
ANION GAP SERPL CALC-SCNC: 7 MMOL/L (ref 5–15)
APPEARANCE UR: CLEAR
AST SERPL-CCNC: 31 U/L (ref 15–37)
BACTERIA URNS QL MICRO: NEGATIVE /HPF
BASOPHILS # BLD: 0.1 K/UL (ref 0–0.1)
BASOPHILS NFR BLD: 1 % (ref 0–1)
BILIRUB SERPL-MCNC: 0.4 MG/DL (ref 0.2–1)
BILIRUB UR QL: NEGATIVE
BUN SERPL-MCNC: 19 MG/DL (ref 6–20)
BUN/CREAT SERPL: 19 (ref 12–20)
CALCIUM SERPL-MCNC: 9.4 MG/DL (ref 8.5–10.1)
CHLORIDE SERPL-SCNC: 102 MMOL/L (ref 97–108)
CO2 SERPL-SCNC: 29 MMOL/L (ref 21–32)
COLOR UR: ABNORMAL
CREAT SERPL-MCNC: 0.99 MG/DL (ref 0.7–1.3)
D DIMER PPP FEU-MCNC: 0.46 MG/L FEU (ref 0–0.65)
DIFFERENTIAL METHOD BLD: ABNORMAL
EOSINOPHIL # BLD: 0.5 K/UL (ref 0–0.4)
EOSINOPHIL NFR BLD: 5 % (ref 0–7)
EPITH CASTS URNS QL MICRO: ABNORMAL /LPF
ERYTHROCYTE [DISTWIDTH] IN BLOOD BY AUTOMATED COUNT: 11.8 % (ref 11.5–14.5)
GLOBULIN SER CALC-MCNC: 3.5 G/DL (ref 2–4)
GLUCOSE SERPL-MCNC: 82 MG/DL (ref 65–100)
GLUCOSE UR STRIP.AUTO-MCNC: NEGATIVE MG/DL
HCT VFR BLD AUTO: 48.1 % (ref 36.6–50.3)
HGB BLD-MCNC: 16.1 G/DL (ref 12.1–17)
HGB UR QL STRIP: NEGATIVE
KETONES UR QL STRIP.AUTO: NEGATIVE MG/DL
LEUKOCYTE ESTERASE UR QL STRIP.AUTO: NEGATIVE
LIPASE SERPL-CCNC: 126 U/L (ref 73–393)
LYMPHOCYTES # BLD: 2.6 K/UL
LYMPHOCYTES NFR BLD: 28 % (ref 12–49)
MCH RBC QN AUTO: 30.7 PG (ref 26–34)
MCHC RBC AUTO-ENTMCNC: 33.5 G/DL (ref 30–36.5)
MCV RBC AUTO: 91.6 FL (ref 80–99)
MONOCYTES # BLD: 1 K/UL (ref 0–1)
MONOCYTES NFR BLD: 11 % (ref 5–13)
MUCOUS THREADS URNS QL MICRO: ABNORMAL /LPF
NEUTS SEG # BLD: 5.3 K/UL (ref 1.8–8)
NEUTS SEG NFR BLD: 55 % (ref 32–75)
NITRITE UR QL STRIP.AUTO: NEGATIVE
PH UR STRIP: 6.5 [PH] (ref 5–8)
PLATELET # BLD AUTO: 213 K/UL (ref 150–400)
PMV BLD AUTO: 9.7 FL (ref 8.9–12.9)
POTASSIUM SERPL-SCNC: 4 MMOL/L (ref 3.5–5.1)
PROT SERPL-MCNC: 7.7 G/DL (ref 6.4–8.2)
PROT UR STRIP-MCNC: NEGATIVE MG/DL
RBC # BLD AUTO: 5.25 M/UL (ref 4.1–5.7)
RBC #/AREA URNS HPF: ABNORMAL /HPF (ref 0–5)
SODIUM SERPL-SCNC: 138 MMOL/L (ref 136–145)
SP GR UR REFRACTOMETRY: 1.02 (ref 1–1.03)
UA: UC IF INDICATED,UAUC: ABNORMAL
UROBILINOGEN UR QL STRIP.AUTO: 0.2 EU/DL (ref 0.2–1)
WBC # BLD AUTO: 9.4 K/UL (ref 4.1–11.1)
WBC URNS QL MICRO: ABNORMAL /HPF (ref 0–4)

## 2019-11-21 PROCEDURE — 74011250637 HC RX REV CODE- 250/637: Performed by: PHYSICIAN ASSISTANT

## 2019-11-21 PROCEDURE — 80053 COMPREHEN METABOLIC PANEL: CPT

## 2019-11-21 PROCEDURE — 83690 ASSAY OF LIPASE: CPT

## 2019-11-21 PROCEDURE — 85025 COMPLETE CBC W/AUTO DIFF WBC: CPT

## 2019-11-21 PROCEDURE — 81001 URINALYSIS AUTO W/SCOPE: CPT

## 2019-11-21 PROCEDURE — 74011000250 HC RX REV CODE- 250: Performed by: PHYSICIAN ASSISTANT

## 2019-11-21 PROCEDURE — 76705 ECHO EXAM OF ABDOMEN: CPT

## 2019-11-21 PROCEDURE — 96374 THER/PROPH/DIAG INJ IV PUSH: CPT

## 2019-11-21 PROCEDURE — 99284 EMERGENCY DEPT VISIT MOD MDM: CPT

## 2019-11-21 PROCEDURE — 85379 FIBRIN DEGRADATION QUANT: CPT

## 2019-11-21 PROCEDURE — 74011250636 HC RX REV CODE- 250/636: Performed by: PHYSICIAN ASSISTANT

## 2019-11-21 PROCEDURE — 36415 COLL VENOUS BLD VENIPUNCTURE: CPT

## 2019-11-21 RX ORDER — ONDANSETRON 4 MG/1
4 TABLET, ORALLY DISINTEGRATING ORAL
Qty: 12 TAB | Refills: 0 | Status: SHIPPED | OUTPATIENT
Start: 2019-11-21 | End: 2022-06-29

## 2019-11-21 RX ORDER — ONDANSETRON 2 MG/ML
4 INJECTION INTRAMUSCULAR; INTRAVENOUS
Status: COMPLETED | OUTPATIENT
Start: 2019-11-21 | End: 2019-11-21

## 2019-11-21 RX ORDER — ONDANSETRON 4 MG/1
4 TABLET, ORALLY DISINTEGRATING ORAL
Status: COMPLETED | OUTPATIENT
Start: 2019-11-21 | End: 2019-11-21

## 2019-11-21 RX ORDER — MORPHINE SULFATE 4 MG/ML
4 INJECTION INTRAVENOUS
Status: DISCONTINUED | OUTPATIENT
Start: 2019-11-21 | End: 2019-11-21 | Stop reason: HOSPADM

## 2019-11-21 RX ORDER — DICLOFENAC POTASSIUM 50 MG/1
50 TABLET, FILM COATED ORAL AS NEEDED
COMMUNITY
End: 2022-06-29

## 2019-11-21 RX ORDER — PANTOPRAZOLE SODIUM 40 MG/1
40 TABLET, DELAYED RELEASE ORAL DAILY
Qty: 20 TAB | Refills: 0 | Status: SHIPPED | OUTPATIENT
Start: 2019-11-21 | End: 2019-12-01

## 2019-11-21 RX ADMIN — ONDANSETRON 4 MG: 4 TABLET, ORALLY DISINTEGRATING ORAL at 16:17

## 2019-11-21 RX ADMIN — SODIUM CHLORIDE 1000 ML: 900 INJECTION, SOLUTION INTRAVENOUS at 15:07

## 2019-11-21 RX ADMIN — LIDOCAINE HYDROCHLORIDE 40 ML: 20 SOLUTION ORAL; TOPICAL at 16:17

## 2019-11-21 RX ADMIN — ONDANSETRON 4 MG: 2 INJECTION INTRAMUSCULAR; INTRAVENOUS at 15:07

## 2019-11-21 NOTE — DISCHARGE INSTRUCTIONS
Patient Education   Adhere to a low-fat diet  Zofran 1-2 tabs every 8 hours if needed for nausea/vomiting  Protonix daily  Follow-up with gastroenterology and consider surgical evaluation  Return for any new or worsening       Biliary Colic: Care Instructions  Your Care Instructions    Biliary (say \"BILL-ee-air-ee\") colic is belly pain caused by gallbladder problems. It is usually caused by a gallstone moving through or blocking the common bile duct or cystic duct. Gallstones are stones that form in the gallbladder. They are made of cholesterol and other substances. The gallbladder is a small sac located just under the liver. It stores bile released by the liver. Bile helps you digest fats. Gallstones also can form in the common bile duct or cystic duct. These ducts carry bile from the gallbladder and the liver to the small intestine. Gallstones may be as small as a grain of sand or as large as a golf ball. Gallstones that cause severe symptoms usually are treated with surgery to remove the gallbladder. If the first attack of biliary colic is mild, it is often safe to wait until you have had another attack before you think about having surgery. The doctor has checked you carefully, but problems can develop later. If you notice any problems or new symptoms, get medical treatment right away. Follow-up care is a key part of your treatment and safety. Be sure to make and go to all appointments, and call your doctor if you are having problems. It's also a good idea to know your test results and keep a list of the medicines you take. How can you care for yourself at home? · Take pain medicines exactly as directed. ? If the doctor gave you a prescription medicine for pain, take it as prescribed. ? If you are not taking a prescription pain medicine, ask your doctor if you can take an over-the-counter medicine. Read and follow all instructions on the label. · Avoid foods that cause symptoms, especially fatty foods. These can cause biliary colic. · You may need more tests to look at your gallbladder. When should you call for help? Call your doctor now or seek immediate medical care if:    · You have a fever.     · You have new belly pain, or your pain gets worse.     · There is a new or increasing yellow tint to your skin or the whites of your eyes.     · Your urine is dark yellow-brown, or your stools are light-colored or white.     · You cannot keep down fluids.    Watch closely for changes in your health, and be sure to contact your doctor if:    · You do not get better as expected.     · You are not getting better after 1 day (24 hours). Where can you learn more? Go to http://christopher-davida.info/. Enter G215 in the search box to learn more about \"Biliary Colic: Care Instructions. \"  Current as of: November 7, 2018  Content Version: 12.2  © 6015-3466 [employer]. Care instructions adapted under license by [employer] (which disclaims liability or warranty for this information). If you have questions about a medical condition or this instruction, always ask your healthcare professional. [address] any warranty or liability for your use of this information.

## 2019-11-21 NOTE — ED NOTES
The patient was discharged home by [place], [place]  in stable condition. The patient is alert and oriented, in no respiratory distress and discharge vital signs obtained. The patient's diagnosis, condition and treatment were explained. The patient expressed understanding. One prescription given. No work/school note given. A discharge plan has been developed. A  was not involved in the process. Aftercare instructions were given. Pt ambulatory out of the ED with family.

## 2019-11-21 NOTE — ED PROVIDER NOTES
28-year-old  male with medical history remarkable for attention deficit hyperactivity disorder, depression, dyspepsia, epididymitis and gastroesophageal reflux disease presenting to the emergency department with complaint of a one-week history of intermittent right upper quadrant abdominal discomfort described as pressure, worse with inspiration and eating. He reports associated nausea with one episode of vomiting this morning. Has been taking Prilosec daily with minimal improvement of symptoms. He reports a prior history of taking large doses of ibuprofen fairly daily. Has not taken in over 2 weeks. He denies any significant EtOH intake. Has been having more frequent bowel movements without appreciable melena or gross blood. No associated fever, chills, headache, dizziness, ear pain, chest pain, shortness of breath, dysuria, hematuria, urinary urgency or frequency.              Past Medical History:   Diagnosis Date    ADHD (attention deficit hyperactivity disorder)     Depression     Dyspepsia and other specified disorders of function of stomach     Epididymitis     Gastrointestinal disorder     GERD    Heat stroke     Musculoskeletal disorder        Past Surgical History:   Procedure Laterality Date    HX HEENT      right eye     HX VASECTOMY           Family History:   Problem Relation Age of Onset    Hypertension Father     High Cholesterol Father     Diabetes Maternal Grandfather     Heart Disease Maternal Grandfather     Heart Attack Maternal Grandfather     Stroke Maternal Grandfather     Heart Attack Paternal Grandfather     Stroke Paternal Grandfather        Social History     Socioeconomic History    Marital status:      Spouse name: Not on file    Number of children: Not on file    Years of education: Not on file    Highest education level: Not on file   Occupational History    Not on file   Social Needs    Financial resource strain: Not on file   [place] insecurity:     Worry: Not on file     Inability: Not on file    Transportation needs:     Medical: Not on file     Non-medical: Not on file   Tobacco Use    Smoking status: Former Smoker     Packs/day: 0.50    Smokeless tobacco: Never Used    Tobacco comment: quit 2018   Substance and Sexual Activity    Alcohol use: Yes     Comment: occassionally    Drug use: Yes     Types: Marijuana    Sexual activity: Yes     Partners: Female   Lifestyle    Physical activity:     Days per week: Not on file     Minutes per session: Not on file    Stress: Not on file   Relationships    Social connections:     Talks on phone: Not on file     Gets together: Not on file     Attends [employer] service: Not on file     Active member of club or organization: Not on file     Attends meetings of clubs or organizations: Not on file     Relationship status: Not on file    Intimate partner violence:     Fear of current or ex partner: Not on file     Emotionally abused: Not on file     Physically abused: Not on file     Forced sexual activity: Not on file   Other Topics Concern    Not on file   Social History Narrative    Not on file         ALLERGIES: Toradol [ketorolac tromethamine]    Review of Systems   Constitutional: Positive for appetite change, chills and diaphoresis. Negative for fever. HENT: Negative for congestion, ear pain, rhinorrhea and sore throat. Eyes: Negative. Respiratory: Positive for cough (chronic related to smoking per patirnt). Negative for shortness of breath. Cardiovascular: Negative for chest pain. Gastrointestinal: Positive for abdominal pain (RUQ) and nausea. Negative for abdominal distention, constipation, diarrhea and vomiting. Genitourinary: Negative for difficulty urinating, dysuria, frequency and urgency. Neurological: Negative for weakness. All other systems reviewed and are negative.       Vitals:    11/21/19 1451   BP: 155/87   Pulse: 72   Resp: 16   Temp: 98.3 °F (36.8 °C) SpO2: 99%   Weight: 92.3 kg (203 lb 7.8 oz)   Height: 5' 6\" (1.676 m)            Physical Exam  Vitals signs and nursing note reviewed. Constitutional:       General: He is not in acute distress. Appearance: He is well-developed. He is not diaphoretic. Comments: Well appearing  male in NAD   HENT:      Head: Normocephalic and atraumatic. Right Ear: External ear normal.      Left Ear: External ear normal.      Nose: Nose normal.      Mouth/Throat:      Pharynx: No oropharyngeal exudate. Eyes:      General:         Right eye: No discharge. Left eye: No discharge. Conjunctiva/sclera: Conjunctivae normal.      Pupils: Pupils are equal, round, and reactive to light. Neck:      Musculoskeletal: Normal range of motion and neck supple. Cardiovascular:      Rate and Rhythm: Normal rate and regular rhythm. Heart sounds: Normal heart sounds. Pulmonary:      Effort: Pulmonary effort is normal.      Breath sounds: Normal breath sounds. No wheezing or rales. Abdominal:      General: Bowel sounds are normal. There is no distension. Palpations: Abdomen is soft. Tenderness: There is tenderness in the right upper quadrant. There is no guarding. Musculoskeletal: Normal range of motion. Lymphadenopathy:      Cervical: No cervical adenopathy. Skin:     General: Skin is warm and dry. Neurological:      Mental Status: He is alert and oriented to person, place, and time. Cranial Nerves: No cranial nerve deficit. Psychiatric:         Behavior: Behavior normal.          MDM  Number of Diagnoses or Management Options  Diagnosis management comments: 58-year-old  male presenting ambulatory to the emergency department with complaint of a one-week history of right upper quadrant discomfort described as pressure with associated nausea and one episode of vomiting. Loose prandial association reported.   Concern for possible biliary colic versus duodenitis versus peptic ulcer disease versus pancreatitis versus obstipation versus UTI versus diverticulitis amongst others  Plan  CBC  CMP  Lipase  D-dimer  U/A  IV fluid  Toradol  Analgesia  Reassess         Amount and/or Complexity of Data Reviewed  Clinical lab tests: ordered and reviewed  Tests in the radiology section of CPT®: ordered and reviewed  Independent visualization of images, tracings, or specimens: yes           Procedures    Progress note      Labs and imaging reviewed  Normal LFTs, normal lipase, white count normal, d-dimer within normal limits, will DC with analgesia, PPI, and GI/general surgery follow-up for clinical presentation suspicious of biliary colic. Yogi Qureshi    Patient's results have been reviewed with them. Patient and/or family have verbally conveyed their understanding and agreement of the patient's signs, symptoms, diagnosis, treatment and prognosis and additionally agree to follow up as recommended or return to the Emergency Room should their condition change prior to follow-up. Discharge instructions have also been provided to the patient with some educational information regarding their diagnosis as well a list of reasons why they would want to return to the ER prior to their follow-up appointment should their condition change.  Yogi Beatty

## 2020-05-16 ENCOUNTER — APPOINTMENT (OUTPATIENT)
Dept: CT IMAGING | Age: 35
End: 2020-05-16
Attending: EMERGENCY MEDICINE
Payer: OTHER GOVERNMENT

## 2020-05-16 ENCOUNTER — HOSPITAL ENCOUNTER (EMERGENCY)
Age: 35
Discharge: HOME | End: 2020-05-16
Attending: EMERGENCY MEDICINE | Admitting: EMERGENCY MEDICINE
Payer: OTHER GOVERNMENT

## 2020-05-16 VITALS
HEIGHT: 66 IN | SYSTOLIC BLOOD PRESSURE: 125 MMHG | TEMPERATURE: 97.5 F | OXYGEN SATURATION: 98 % | BODY MASS INDEX: 28.93 KG/M2 | RESPIRATION RATE: 18 BRPM | HEART RATE: 70 BPM | WEIGHT: 180 LBS | DIASTOLIC BLOOD PRESSURE: 55 MMHG

## 2020-05-16 DIAGNOSIS — E86.0 DEHYDRATION: ICD-10-CM

## 2020-05-16 DIAGNOSIS — K29.50 OTHER CHRONIC GASTRITIS WITHOUT HEMORRHAGE: ICD-10-CM

## 2020-05-16 DIAGNOSIS — K31.84 GASTROPARESIS: Primary | ICD-10-CM

## 2020-05-16 LAB
ALBUMIN SERPL-MCNC: 4.3 G/DL (ref 3.5–5)
ALBUMIN/GLOB SERPL: 1.3 {RATIO} (ref 1.1–2.2)
ALP SERPL-CCNC: 70 U/L (ref 45–117)
ALT SERPL-CCNC: 60 U/L (ref 12–78)
AMYLASE SERPL-CCNC: 56 U/L (ref 25–115)
ANION GAP SERPL CALC-SCNC: 5 MMOL/L (ref 5–15)
APPEARANCE UR: CLEAR
AST SERPL-CCNC: 32 U/L (ref 15–37)
BACTERIA URNS QL MICRO: NEGATIVE /HPF
BASOPHILS # BLD: 0 K/UL (ref 0–0.1)
BASOPHILS NFR BLD: 1 % (ref 0–1)
BILIRUB SERPL-MCNC: 0.6 MG/DL (ref 0.2–1)
BILIRUB UR QL: NEGATIVE
BUN SERPL-MCNC: 15 MG/DL (ref 6–20)
BUN/CREAT SERPL: 15 (ref 12–20)
CALCIUM SERPL-MCNC: 9 MG/DL (ref 8.5–10.1)
CHLORIDE SERPL-SCNC: 105 MMOL/L (ref 97–108)
CO2 SERPL-SCNC: 26 MMOL/L (ref 21–32)
COLOR UR: ABNORMAL
COMMENT, HOLDF: NORMAL
CREAT SERPL-MCNC: 0.99 MG/DL (ref 0.7–1.3)
DIFFERENTIAL METHOD BLD: NORMAL
EOSINOPHIL # BLD: 0 K/UL (ref 0–0.4)
EOSINOPHIL NFR BLD: 0 % (ref 0–7)
EPITH CASTS URNS QL MICRO: ABNORMAL /LPF
ERYTHROCYTE [DISTWIDTH] IN BLOOD BY AUTOMATED COUNT: 11.7 % (ref 11.5–14.5)
GLOBULIN SER CALC-MCNC: 3.3 G/DL (ref 2–4)
GLUCOSE SERPL-MCNC: 91 MG/DL (ref 65–100)
GLUCOSE UR STRIP.AUTO-MCNC: NEGATIVE MG/DL
HCT VFR BLD AUTO: 45.4 % (ref 36.6–50.3)
HGB BLD-MCNC: 15.8 G/DL (ref 12.1–17)
HGB UR QL STRIP: NEGATIVE
HYALINE CASTS URNS QL MICRO: ABNORMAL /LPF (ref 0–5)
IMM GRANULOCYTES # BLD AUTO: 0 K/UL (ref 0–0.04)
IMM GRANULOCYTES NFR BLD AUTO: 0 % (ref 0–0.5)
KETONES UR QL STRIP.AUTO: ABNORMAL MG/DL
LEUKOCYTE ESTERASE UR QL STRIP.AUTO: NEGATIVE
LIPASE SERPL-CCNC: 91 U/L (ref 73–393)
LYMPHOCYTES # BLD: 1.3 K/UL (ref 0.8–3.5)
LYMPHOCYTES NFR BLD: 20 % (ref 12–49)
MCH RBC QN AUTO: 31.3 PG (ref 26–34)
MCHC RBC AUTO-ENTMCNC: 34.8 G/DL (ref 30–36.5)
MCV RBC AUTO: 89.9 FL (ref 80–99)
MONOCYTES # BLD: 0.8 K/UL (ref 0–1)
MONOCYTES NFR BLD: 12 % (ref 5–13)
NEUTS SEG # BLD: 4.4 K/UL (ref 1.8–8)
NEUTS SEG NFR BLD: 67 % (ref 32–75)
NITRITE UR QL STRIP.AUTO: NEGATIVE
NRBC # BLD: 0 K/UL (ref 0–0.01)
NRBC BLD-RTO: 0 PER 100 WBC
PH UR STRIP: 5.5 [PH] (ref 5–8)
PLATELET # BLD AUTO: 172 K/UL (ref 150–400)
PMV BLD AUTO: 9 FL (ref 8.9–12.9)
POTASSIUM SERPL-SCNC: 3.7 MMOL/L (ref 3.5–5.1)
PROT SERPL-MCNC: 7.6 G/DL (ref 6.4–8.2)
PROT UR STRIP-MCNC: NEGATIVE MG/DL
RBC # BLD AUTO: 5.05 M/UL (ref 4.1–5.7)
RBC #/AREA URNS HPF: ABNORMAL /HPF (ref 0–5)
SAMPLES BEING HELD,HOLD: NORMAL
SODIUM SERPL-SCNC: 136 MMOL/L (ref 136–145)
SP GR UR REFRACTOMETRY: 1.02 (ref 1–1.03)
UA: UC IF INDICATED,UAUC: ABNORMAL
UROBILINOGEN UR QL STRIP.AUTO: 1 EU/DL (ref 0.2–1)
WBC # BLD AUTO: 6.5 K/UL (ref 4.1–11.1)
WBC URNS QL MICRO: ABNORMAL /HPF (ref 0–4)

## 2020-05-16 PROCEDURE — 74011250636 HC RX REV CODE- 250/636: Performed by: EMERGENCY MEDICINE

## 2020-05-16 PROCEDURE — 80053 COMPREHEN METABOLIC PANEL: CPT

## 2020-05-16 PROCEDURE — 96361 HYDRATE IV INFUSION ADD-ON: CPT

## 2020-05-16 PROCEDURE — 74177 CT ABD & PELVIS W/CONTRAST: CPT

## 2020-05-16 PROCEDURE — 36415 COLL VENOUS BLD VENIPUNCTURE: CPT

## 2020-05-16 PROCEDURE — 74011000250 HC RX REV CODE- 250: Performed by: EMERGENCY MEDICINE

## 2020-05-16 PROCEDURE — 81001 URINALYSIS AUTO W/SCOPE: CPT

## 2020-05-16 PROCEDURE — 82150 ASSAY OF AMYLASE: CPT

## 2020-05-16 PROCEDURE — 96374 THER/PROPH/DIAG INJ IV PUSH: CPT

## 2020-05-16 PROCEDURE — C9113 INJ PANTOPRAZOLE SODIUM, VIA: HCPCS | Performed by: EMERGENCY MEDICINE

## 2020-05-16 PROCEDURE — 99284 EMERGENCY DEPT VISIT MOD MDM: CPT

## 2020-05-16 PROCEDURE — 74011636320 HC RX REV CODE- 636/320: Performed by: RADIOLOGY

## 2020-05-16 PROCEDURE — 85025 COMPLETE CBC W/AUTO DIFF WBC: CPT

## 2020-05-16 PROCEDURE — 96375 TX/PRO/DX INJ NEW DRUG ADDON: CPT

## 2020-05-16 PROCEDURE — 83690 ASSAY OF LIPASE: CPT

## 2020-05-16 RX ORDER — DIPHENHYDRAMINE HYDROCHLORIDE 50 MG/ML
50 INJECTION, SOLUTION INTRAMUSCULAR; INTRAVENOUS
Status: COMPLETED | OUTPATIENT
Start: 2020-05-16 | End: 2020-05-16

## 2020-05-16 RX ORDER — PROCHLORPERAZINE EDISYLATE 5 MG/ML
10 INJECTION INTRAMUSCULAR; INTRAVENOUS
Status: COMPLETED | OUTPATIENT
Start: 2020-05-16 | End: 2020-05-16

## 2020-05-16 RX ORDER — DICYCLOMINE HYDROCHLORIDE 10 MG/1
10 CAPSULE ORAL 4 TIMES DAILY
Qty: 20 CAP | Refills: 0 | Status: SHIPPED | OUTPATIENT
Start: 2020-05-16 | End: 2020-05-16

## 2020-05-16 RX ORDER — ONDANSETRON 2 MG/ML
8 INJECTION INTRAMUSCULAR; INTRAVENOUS
Status: COMPLETED | OUTPATIENT
Start: 2020-05-16 | End: 2020-05-16

## 2020-05-16 RX ORDER — FENTANYL CITRATE 50 UG/ML
100 INJECTION, SOLUTION INTRAMUSCULAR; INTRAVENOUS
Status: COMPLETED | OUTPATIENT
Start: 2020-05-16 | End: 2020-05-16

## 2020-05-16 RX ORDER — DICYCLOMINE HYDROCHLORIDE 10 MG/1
10 CAPSULE ORAL 4 TIMES DAILY
Qty: 20 CAP | Refills: 0 | Status: SHIPPED | OUTPATIENT
Start: 2020-05-16 | End: 2020-05-21

## 2020-05-16 RX ORDER — ONDANSETRON 8 MG/1
8 TABLET, ORALLY DISINTEGRATING ORAL
Qty: 15 TAB | Refills: 0 | Status: SHIPPED | OUTPATIENT
Start: 2020-05-16

## 2020-05-16 RX ADMIN — ONDANSETRON 8 MG: 2 INJECTION INTRAMUSCULAR; INTRAVENOUS at 20:22

## 2020-05-16 RX ADMIN — FENTANYL CITRATE 100 MCG: 50 INJECTION INTRAMUSCULAR; INTRAVENOUS at 20:25

## 2020-05-16 RX ADMIN — IOPAMIDOL 100 ML: 755 INJECTION, SOLUTION INTRAVENOUS at 22:05

## 2020-05-16 RX ADMIN — PROCHLORPERAZINE EDISYLATE 10 MG: 5 INJECTION INTRAMUSCULAR; INTRAVENOUS at 22:40

## 2020-05-16 RX ADMIN — SODIUM CHLORIDE 1000 ML: 900 INJECTION, SOLUTION INTRAVENOUS at 20:31

## 2020-05-16 RX ADMIN — DIPHENHYDRAMINE HYDROCHLORIDE 50 MG: 50 INJECTION, SOLUTION INTRAMUSCULAR; INTRAVENOUS at 22:40

## 2020-05-16 RX ADMIN — SODIUM CHLORIDE 1000 ML: 900 INJECTION, SOLUTION INTRAVENOUS at 22:40

## 2020-05-16 RX ADMIN — SODIUM CHLORIDE 40 MG: 9 INJECTION INTRAMUSCULAR; INTRAVENOUS; SUBCUTANEOUS at 20:27

## 2020-05-16 NOTE — ED TRIAGE NOTES
Pt reports dry heaving, unable to eat or drink for past 2 days. Reports that he had upper GI approx 1 month ago which revealed a hiatal hernia. Takes omeprazole for GERD but it has not been working over past 2 days since this episode began.

## 2020-05-17 NOTE — DISCHARGE INSTRUCTIONS
Patient Education        Gastroparesis: Care Instructions  Your Care Instructions    When you have gastroparesis, your stomach takes a lot longer to empty. This delay can cause belly pain, bloating, and belching. It also can cause hiccups, heartburn, nausea or vomiting. You may not feel like eating. These symptoms may come and go. They most often occur during and after meals. You may feel full after only a few bites of food. This condition occurs when the nerves to the stomach don't work properly. Diabetes is the most common cause of this nerve damage. Gastroparesis can make it harder to control your blood sugar levels. But keeping your blood sugar levels under control may help with your symptoms. Parkinson's disease, stroke, and some medicines can also cause this condition. Home treatment can often help. Follow-up care is a key part of your treatment and safety. Be sure to make and go to all appointments, and call your doctor if you are having problems. It's also a good idea to know your test results and keep a list of the medicines you take. How can you care for yourself at home? · Eat several small meals each day rather than three large meals. · Eat foods that are low in fiber and fat. · If your doctor suggests it, take medicines that help the stomach empty more quickly. These are called motility agents. When should you call for help? Call your doctor now or seek immediate medical care if:    · You are vomiting.     · You have new or worse belly pain.     · You have a fever.     · You cannot pass stools or gas.    Watch closely for changes in your health, and be sure to contact your doctor if you have any problems. Where can you learn more? Go to http://christopher-davida.info/  Enter M106 in the search box to learn more about \"Gastroparesis: Care Instructions. \"  Current as of: August 11, 2019Content Version: 12.4  © 1258-8455 [employer].   Care instructions adapted under license by [address] (which disclaims liability or warranty for this information). If you have questions about a medical condition or this instruction, always ask your healthcare professional. [address] any warranty or liability for your use of this information.

## 2020-05-17 NOTE — ED PROVIDER NOTES
The patient is a 39-year-old male with a past medical history significant for ADHD, depression, epididymitis, GERD, heatstroke, hiatal hernia who presents to the ED with a complaint of epigastric discomfort that began approximately 2 days ago described as dull and throbbing in nature, severity 8 out of 10, constant, without any aggravating or relieving factors and nonradiating. The patient also admits to nausea and vomiting, with decreased appetite and activity. He had schedule an EGD at the [place] approximately 3 months ago that could not be performed because the patient could not be sedated. He has a history of prior ventral hernia repair. He denies any fever, chills, cough or congestion, headache, neck and back pain, chest pain, shortness of breath, diarrhea, constipation, dysuria, hematuria, dizziness, extremity weakness or numbness, sick contact, skin rash and recent travel.            Past Medical History:   Diagnosis Date    ADHD (attention deficit hyperactivity disorder)     Depression     Dyspepsia and other specified disorders of function of stomach     Epididymitis     Gastrointestinal disorder     GERD    Heat stroke     Musculoskeletal disorder        Past Surgical History:   Procedure Laterality Date    HX HEENT      right eye     HX VASECTOMY           Family History:   Problem Relation Age of Onset    Hypertension Father     High Cholesterol Father     Diabetes Maternal Grandfather     Heart Disease Maternal Grandfather     Heart Attack Maternal Grandfather     Stroke Maternal Grandfather     Heart Attack Paternal Grandfather     Stroke Paternal Grandfather        Social History     Socioeconomic History    Marital status:      Spouse name: Not on file    Number of children: Not on file    Years of education: Not on file    Highest education level: Not on file   Occupational History    Not on file   Social Needs    Financial resource strain: Not on file   [place]-Dor[place] insecurity     Worry: Not on file     Inability: Not on file    Transportation needs     Medical: Not on file     Non-medical: Not on file   Tobacco Use    Smoking status: Former Smoker     Packs/day: 0.50    Smokeless tobacco: Never Used    Tobacco comment: quit 2018   Substance and Sexual Activity    Alcohol use: Yes     Comment: occassionally    Drug use: Yes     Types: Marijuana    Sexual activity: Yes     Partners: Female   Lifestyle    Physical activity     Days per week: Not on file     Minutes per session: Not on file    Stress: Not on file   Relationships    Social connections     Talks on phone: Not on file     Gets together: Not on file     Attends Spiritism service: Not on file     Active member of club or organization: Not on file     Attends meetings of clubs or organizations: Not on file     Relationship status: Not on file    Intimate partner violence     Fear of current or ex partner: Not on file     Emotionally abused: Not on file     Physically abused: Not on file     Forced sexual activity: Not on file   Other Topics Concern    Not on file   Social History Narrative    Not on file         ALLERGIES: Toradol [ketorolac tromethamine]    Review of Systems    Vitals:    05/16/20 1828   BP: 112/67   Pulse: 76   Resp: 22   Temp: 98.4 °F (36.9 °C)   SpO2: 97%   Weight: 81.6 kg (180 lb)   Height: 5' 6\" (1.676 m)            Physical Exam  Vitals signs and nursing note reviewed. Exam conducted with a chaperone present. CONSTITUTIONAL: Well-appearing; well-nourished; in mild distress  HEAD: Normocephalic; atraumatic  EYES: PERRL; EOM intact; conjunctiva and sclera are clear bilaterally. ENT: No rhinorrhea; normal pharynx with no tonsillar hypertrophy; mucous membranes pink/moist, no erythema, no exudate. NECK: Supple; non-tender; no cervical lymphadenopathy  CARD: Normal S1, S2; no murmurs, rubs, or gallops. Regular rate and rhythm.   RESP: Normal respiratory effort; breath sounds clear and equal bilaterally; no wheezes, rhonchi, or rales. ABD: Normal bowel sounds; non-distended; mild epigastric tenderness without any rebound or guarding; no palpable organomegaly, no masses, no bruits. Back Exam: Normal inspection; no vertebral point tenderness, no CVA tenderness. Normal range of motion. EXT: Normal ROM in all four extremities; non-tender to palpation; no swelling or deformity; distal pulses are normal, no edema. SKIN: Warm; dry; no rash. NEURO:Alert and oriented x 3, coherent, JORGE-XII grossly intact, sensory and motor are non-focal.        MDM  Number of Diagnoses or Management Options  Dehydration:   Gastroparesis:   Other chronic gastritis without hemorrhage:   Diagnosis management comments: Assessment: Differential diagnosis include acute exacerbation of GERD/pancreatitis /biliary colic bowel obstruction/rule out electrolyte abnormality and dehydration    Plan: Lab/IV fluid/antiemetic and analgesia/CT scan of the abdomen and pelvis/serial exam/ Monitor and Reevaluate.          Amount and/or Complexity of Data Reviewed  Clinical lab tests: ordered and reviewed  Tests in the radiology section of CPT®: ordered and reviewed  Tests in the medicine section of CPT®: reviewed and ordered  Discussion of test results with the performing providers: yes  Decide to obtain previous medical records or to obtain history from someone other than the patient: yes  Obtain history from someone other than the patient: yes  Review and summarize past medical records: yes  Discuss the patient with other providers: yes  Independent visualization of images, tracings, or specimens: yes    Risk of Complications, Morbidity, and/or Mortality  Presenting problems: moderate  Diagnostic procedures: moderate  Management options: moderate    Critical Care  Total time providing critical care: < 30 minutes    Patient Progress  Patient progress: stable         Procedures    Progress Note:   Pt has been reexamined by Prince horta Jp Solomon MD. Pt is feeling much better. Symptoms have improved. All available results have been reviewed with pt and any available family. Pt understands sx, dx, and tx in ED. Care plan has been outlined and questions have been answered. The patient was given p.o. challenge that she tolerated well. She denies any further discomfort. Pt is ready to go home. Will send home on gastroparesis and gastritis and dehydration instruction. Prescription Bentyl, Zofran and Pepcid. Outpatient referral with PCP as needed. Written by Samra Abrams MD,5:40 AM    .   .

## 2020-05-18 ENCOUNTER — PATIENT OUTREACH (OUTPATIENT)
Dept: FAMILY MEDICINE CLINIC | Age: 35
End: 2020-05-18

## 2020-06-22 ENCOUNTER — APPOINTMENT (OUTPATIENT)
Dept: GENERAL RADIOLOGY | Age: 35
End: 2020-06-22
Attending: EMERGENCY MEDICINE
Payer: OTHER GOVERNMENT

## 2020-06-22 ENCOUNTER — HOSPITAL ENCOUNTER (EMERGENCY)
Age: 35
Discharge: HOME | End: 2020-06-22
Attending: EMERGENCY MEDICINE
Payer: OTHER GOVERNMENT

## 2020-06-22 VITALS
DIASTOLIC BLOOD PRESSURE: 83 MMHG | HEART RATE: 68 BPM | TEMPERATURE: 96.7 F | OXYGEN SATURATION: 98 % | BODY MASS INDEX: 30.54 KG/M2 | RESPIRATION RATE: 15 BRPM | SYSTOLIC BLOOD PRESSURE: 129 MMHG | HEIGHT: 66 IN | WEIGHT: 190.04 LBS

## 2020-06-22 DIAGNOSIS — F41.8 ANXIETY ASSOCIATED WITH DEPRESSION: Primary | ICD-10-CM

## 2020-06-22 DIAGNOSIS — Z72.0 VAPES NICOTINE CONTAINING SUBSTANCE: ICD-10-CM

## 2020-06-22 DIAGNOSIS — Z91.14 HISTORY OF MEDICATION NONCOMPLIANCE: ICD-10-CM

## 2020-06-22 PROCEDURE — 74011250636 HC RX REV CODE- 250/636: Performed by: EMERGENCY MEDICINE

## 2020-06-22 PROCEDURE — 99283 EMERGENCY DEPT VISIT LOW MDM: CPT

## 2020-06-22 PROCEDURE — 96372 THER/PROPH/DIAG INJ SC/IM: CPT

## 2020-06-22 PROCEDURE — 71046 X-RAY EXAM CHEST 2 VIEWS: CPT

## 2020-06-22 RX ORDER — DICLOFENAC SODIUM 75 MG/1
TABLET, DELAYED RELEASE ORAL
COMMUNITY
Start: 2020-04-07 | End: 2022-06-29

## 2020-06-22 RX ORDER — HALOPERIDOL 5 MG/ML
5 INJECTION INTRAMUSCULAR ONCE
Status: COMPLETED | OUTPATIENT
Start: 2020-06-22 | End: 2020-06-22

## 2020-06-22 RX ORDER — IBUPROFEN 200 MG
800 TABLET ORAL DAILY
COMMUNITY
End: 2022-06-29

## 2020-06-22 RX ORDER — HYDROXYZINE HYDROCHLORIDE 10 MG/1
TABLET, FILM COATED ORAL
COMMUNITY
Start: 2020-05-29 | End: 2022-06-29

## 2020-06-22 RX ORDER — HALOPERIDOL 5 MG/ML
5 INJECTION INTRAMUSCULAR
Status: DISCONTINUED | OUTPATIENT
Start: 2020-06-22 | End: 2020-06-22

## 2020-06-22 RX ORDER — PROPRANOLOL HYDROCHLORIDE 10 MG/1
TABLET ORAL
COMMUNITY
Start: 2020-05-29 | End: 2022-06-29

## 2020-06-22 RX ORDER — ATORVASTATIN CALCIUM 80 MG/1
40 TABLET, FILM COATED ORAL DAILY
COMMUNITY
Start: 2020-05-04

## 2020-06-22 RX ORDER — HALOPERIDOL 5 MG/ML
INJECTION INTRAMUSCULAR
Status: DISCONTINUED
Start: 2020-06-22 | End: 2020-06-22 | Stop reason: HOSPADM

## 2020-06-22 RX ADMIN — HALOPERIDOL LACTATE 5 MG: 5 INJECTION, SOLUTION INTRAMUSCULAR at 09:55

## 2020-06-22 NOTE — ED TRIAGE NOTES
Pt rpts \"anxiety\" symptoms since last night. Pt rpts he normally goes to the [place] and had requested to be taken off his Lexapro. Pt rpts \"anxiety\" symptoms have worsened since stopping the medication and unable to get RX refilled by the [place].

## 2020-06-22 NOTE — ED PROVIDER NOTES
Please note that this dictation was completed with [employer], the computer voice recognition software.  Quite often unanticipated grammatical, syntax, homophones, and other interpretive errors are inadvertently transcribed by the computer software.  Please disregard these errors.  Please excuse any errors that have escaped final proofreading. 41-year-old male past medical history markable for ADHD, depression, dyspepsia, epididymitis, GERD, heatstroke, hiatal hernia, depression/anxiety (treated at the [place]) and scheduled for an EGD in August at the [place] presents complaining of increased chest pressure pain midepigastric pain reflux symptoms and dry heaves. Patient states he stopped taking his Lexapro approximately 4 weeks ago on its own states he is finished going through the withdrawal symptoms of that but now is having issues with anxiety. Patient relates panic attacks intermittently worsening over the past 2 to 3 days. Patient states he also uses marijuana occasionally and has had increased episodes of dry heaving x2 days. Patient states he was able to eat fish and rice last night although he had an episode of dry heaving fish and rice did stay down. He denies overt fevers trauma significant episodes of diarrhea constipation abdominal pains other than the midepigastric pain. Patient denies smoking cigarettes but he does vape. He denies abusing other drugs.     Pt/ sig other denies SI/ HI;     pt denies HA, vison changes, diff swallowing, SOB,  F/Ch,  D/Cons or other current systemic complaints    Social/ PSH reviewed in EMR    EMR Chart Reviewed           Past Medical History:   Diagnosis Date    ADHD (attention deficit hyperactivity disorder)     Depression     Dyspepsia and other specified disorders of function of stomach     Epididymitis     Gastrointestinal disorder     GERD    Heat stroke     Musculoskeletal disorder        Past Surgical History:   Procedure Laterality Date    HX HEENT      right eye     HX VASECTOMY           Family History:   Problem Relation Age of Onset    Hypertension Father     High Cholesterol Father     Diabetes Maternal Grandfather     Heart Disease Maternal Grandfather     Heart Attack Maternal Grandfather     Stroke Maternal Grandfather     Heart Attack Paternal Grandfather     Stroke Paternal Grandfather        Social History     Socioeconomic History    Marital status:      Spouse name: Not on file    Number of children: Not on file    Years of education: Not on file    Highest education level: Not on file   Occupational History    Not on file   Social Needs    Financial resource strain: Not on file    Food insecurity     Worry: Not on file     Inability: Not on file   [employer] needs     Medical: Not on file     Non-medical: Not on file   Tobacco Use    Smoking status: Former Smoker     Packs/day: 0.50    Smokeless tobacco: Never Used    Tobacco comment: quit 2018   Substance and Sexual Activity    Alcohol use: Yes     Comment: occassionally    Drug use: Yes     Types: Marijuana    Sexual activity: Yes     Partners: Female   Lifestyle    Physical activity     Days per week: Not on file     Minutes per session: Not on file    Stress: Not on file   Relationships    Social connections     Talks on phone: Not on file     Gets together: Not on file     Attends Hinduism service: Not on file     Active member of club or organization: Not on file     Attends meetings of clubs or organizations: Not on file     Relationship status: Not on file    Intimate partner violence     Fear of current or ex partner: Not on file     Emotionally abused: Not on file     Physically abused: Not on file     Forced sexual activity: Not on file   Other Topics Concern    Not on file   Social History Narrative    Not on file         ALLERGIES: Toradol [ketorolac tromethamine]    Review of Systems   Constitutional: Negative for appetite change, chills and fever. HENT: Negative for drooling, trouble swallowing and voice change. Eyes: Negative for visual disturbance. Respiratory: Positive for chest tightness. Negative for shortness of breath. Cardiovascular: Negative for chest pain, palpitations and leg swelling. Gastrointestinal: Positive for abdominal pain and nausea. Negative for constipation, diarrhea and vomiting. Genitourinary: Negative for dysuria. Skin: Negative for rash. Neurological: Negative for speech difficulty and headaches. All other systems reviewed and are negative. There were no vitals filed for this visit. Physical Exam  Vitals signs and nursing note reviewed. Constitutional:       General: He is not in acute distress. Appearance: Normal appearance. He is well-developed. He is not ill-appearing, toxic-appearing or diaphoretic. Comments: Anxious, NAD, AxOx4, speaking in complete sentences     HENT:      Head: Normocephalic and atraumatic. Right Ear: External ear normal.      Left Ear: External ear normal.      Mouth/Throat:      Pharynx: No oropharyngeal exudate. Eyes:      General: No scleral icterus. Right eye: No discharge. Left eye: No discharge. Extraocular Movements: Extraocular movements intact. Conjunctiva/sclera: Conjunctivae normal.      Pupils: Pupils are equal, round, and reactive to light. Neck:      Musculoskeletal: Normal range of motion and neck supple. Cardiovascular:      Rate and Rhythm: Normal rate and regular rhythm. Pulses: Normal pulses. Heart sounds: Normal heart sounds. No murmur. No friction rub. No gallop. Pulmonary:      Effort: Pulmonary effort is normal. No respiratory distress. Breath sounds: Normal breath sounds. No wheezing or rales. Chest:      Chest wall: No tenderness. Abdominal:      General: Bowel sounds are normal. There is no distension. Palpations: Abdomen is soft. There is no mass. Tenderness:  There is no abdominal tenderness. There is no guarding or rebound. Comments: nttp     Genitourinary:     Comments: Pt denies urinary/ Testicular/ scrotal or penile  complaints  Musculoskeletal: Normal range of motion. General: No swelling, tenderness, deformity or signs of injury. Right lower leg: No edema. Left lower leg: No edema. Lymphadenopathy:      Cervical: No cervical adenopathy. Skin:     General: Skin is warm and dry. Capillary Refill: Capillary refill takes less than 2 seconds. Coloration: Skin is not jaundiced or pale. Findings: No bruising, erythema, lesion or rash. Neurological:      General: No focal deficit present. Mental Status: He is alert and oriented to person, place, and time. Cranial Nerves: No cranial nerve deficit. Sensory: No sensory deficit. Motor: No weakness. Coordination: Coordination normal.      Gait: Gait normal.      Deep Tendon Reflexes: Reflexes normal.      Comments: pt has motor/ CV/ Sensation grossly intact to all extremities, R = L in strength;          MDM       Procedures    Chief Complaint   Patient presents with    Panic Attack       9:32 AM  The patients presenting problems have been discussed, and they are in agreement with the care plan formulated and outlined with them. I have encouraged them to ask questions as they arise throughout their visit. MEDICATIONS GIVEN:  Medications   haloperidol lactate (HALDOL) injection 5 mg (has no administration in time range)       LABS REVIEWED:  Labs Reviewed   TROPONIN I   METABOLIC PANEL, COMPREHENSIVE   CBC WITH AUTOMATED DIFF   LIPASE       RADIOLOGY RESULTS:  The following have been ordered and reviewed:  _____________________________________________________________________  _____________________________________________________________________    EKG interpretation:   Pt refused    PROCEDURES:        CONSULTATIONS:       PROGRESS NOTES:      DIAGNOSIS:    1.  Anxiety associated with depression    2. History of medication noncompliance    3. Vapes nicotine containing substance        PLAN:  1-pt 'wants to leave now to go to the [place]';       ED COURSE: The patients hospital course has been uncomplicated. 9:50 AM  Notified that Pt refusing EKG/ IV access/ 'ok with a shot'; will give haldol IM;     10:31 AM  'better now (after Haldol IM)  - [place] been texting my shrink'; agrees to allow ekg/ labs now; 'My skin is no longer crawling';      10:41 AM 'Im leaving now - they will see me at the [place]';   [employer]  results have been reviewed with him. He has been counseled regarding his diagnosis. He verbally conveys understanding and agreement of the signs, symptoms, diagnosis, treatment and prognosis and additionally agrees to Call/ Arrange follow up as recommended with Janet Reyes, MD in 24 - 48 hours. He also agrees with the care-plan and conveys that all of his questions have been answered. I have also put together some discharge instructions for him that include: 1) educational information regarding their diagnosis, 2) how to care for their diagnosis at home, as well a 3) list of reasons why they would want to return to the ED prior to their follow-up appointment, should their condition change or for concerns.

## 2020-06-22 NOTE — DISCHARGE INSTRUCTIONS
Patient Education        Learning About Anxiety Disorders  What are anxiety disorders? Anxiety disorders are a type of medical problem. They cause severe anxiety. When you feel anxious, you feel that something bad is about to happen. This feeling interferes with your life. These disorders include:  · Generalized anxiety disorder. You feel worried and stressed about many everyday events and activities. This goes on for several months and disrupts your life on most days. · Panic disorder. You have repeated panic attacks. A panic attack is a sudden, intense fear or anxiety. It may make you feel short of breath. Your heart may pound. · Social anxiety disorder. You feel very anxious about what you will say or do in front of people. For example, you may be scared to talk or eat in public. This problem affects your daily life. · Phobias. You are very scared of a specific object, situation, or activity. For example, you may fear spiders, high places, or small spaces. What are the symptoms? Generalized anxiety disorder  Symptoms may include:  · Feeling worried and stressed about many things almost every day. · Feeling tired or irritable. You may have a hard time concentrating. · Having headaches or muscle aches. · Having a hard time getting to sleep or staying asleep. Panic disorder  You may have repeated panic attacks when there is no reason for feeling afraid. You may change your daily activities because you worry that you will have another attack. Symptoms may include:  · Intense fear, terror, or anxiety. · Trouble breathing or very fast breathing. · Chest pain or tightness. · A heartbeat that races or is not regular. Social anxiety disorder  Symptoms may include:  · Fear about a social situation, such as eating in front of others or speaking in public. You may worry a lot. Or you may be afraid that something bad will happen. · Anxiety that can cause you to blush, sweat, and feel shaky.   · A heartbeat that is faster than normal.  · A hard time focusing. Phobias  Symptoms may include:  · More fear than most people of being around an object, being in a situation, or doing an activity. You might also be stressed about the chance of being around the thing you fear. · Worry about losing control, panicking, fainting, or having physical symptoms like a faster heartbeat when you are around the situation or object. How are these disorders treated? Anxiety disorders can be treated with medicines or counseling. A combination of both may be used. Medicines may include:  · Antidepressants. These may help your symptoms by keeping chemicals in your brain in balance. · Benzodiazepines. These may give you short-term relief of your symptoms. Some people use cognitive-behavioral therapy. A therapist helps you learn to change stressful or bad thoughts into helpful thoughts. Lead a healthy lifestyle  A healthy lifestyle may help you feel better. · Get at least 30 minutes of exercise on most days of the week. Walking is a good choice. · Eat a healthy diet. Include fruits, vegetables, lean proteins, and whole grains in your diet each day. · Try to go to bed at the same time every night. Try for 8 hours of sleep a night. · Find ways to manage stress. Try relaxation exercises. · Avoid alcohol and illegal drugs. Follow-up care is a key part of your treatment and safety. Be sure to make and go to all appointments, and call your doctor if you are having problems. It's also a good idea to know your test results and keep a list of the medicines you take. Where can you learn more? Go to http://christopher-davida.info/  Enter G231 in the search box to learn more about \"Learning About Anxiety Disorders. \"  Current as of: January 31, 2020               Content Version: 12.5  © 7337-7552 Healthwise, Incorporated.    Care instructions adapted under license by [employer] (which disclaims liability or warranty for this information). If you have questions about a medical condition or this instruction, always ask your healthcare professional. [address] any warranty or liability for your use of this information. Patient Education        Preventing a Relapse of Depression: Care Instructions  Your Care Instructions     A relapse of depression means your symptoms have come back after you have gotten better. This illness often comes and goes during a lifetime. But there are many things you can do to keep it from coming back. Follow-up care is a key part of your treatment and safety. Be sure to make and go to all appointments, and call your doctor if you are having problems. It's also a good idea to know your test results and keep a list of the medicines you take. What do you need to know? Know your risk of relapse  Talk to your doctor to find out if you are at risk of relapse. Many things can make a person more likely to relapse into depression. These include having a family member with depression, dealing with serious problems in a relationship or a job, having a serious medical condition, or substance use disorder. It is important to know your risk and to recognize warning signs of relapse. Once you know these things, you will be better able to keep it from happening to you. Know the warning signs of relapse  The two most common signs of relapse are:  · Feeling sad or hopeless. · Losing interest in your daily activities. You may have other symptoms, such as:  · You lose or gain weight. · You sleep too much or not enough. · You feel restless and unable to sit still. · You feel unable to move. · You feel tired all the time. · You feel unworthy or guilty without an obvious reason. · You have problems concentrating, remembering, or making decisions. · You think often about death or suicide. · You feel angry or have panic attacks. How can you care for yourself at home?   · Take your medicine as prescribed. Call your doctor if you have any problems with your medicine. Many people take their medicines for at least 6 months after they have recovered. This often helps keep symptoms from coming back. However, if your depression keeps coming back, you may have to take medicine for the rest of your life. · Continue counseling even after you have stopped taking medicine. · Eat healthy foods. Include fruits, vegetables, beans, and whole grains in your diet each day. · Get at least 30 minutes of exercise on most days of the week. Walking is a good choice. You also may want to do other activities, such as running, swimming, cycling, or playing tennis or team sports. · See your doctor right away if you have new symptoms or feel that your depression is coming back. · Keep a regular sleep schedule. Try for 8 hours of sleep a night. · Avoid alcohol and illegal drugs. · Keep the numbers for these national suicide hotlines: 0-552-267-TALK (2-843.432.3017) and 4-702-BSVCYHC (8-215.170.8431). If you or someone you know talks about suicide or feeling hopeless, get help right away. When should you call for help? [id number] anytime you think you may need emergency care. For example, call if:  · You are thinking about suicide or are threatening suicide. · You feel you cannot stop from hurting yourself or someone else. · You hear or see things that aren't real.  · You think or speak in a bizarre way that is not like your usual behavior. Call your doctor now or seek immediate medical care if:  · You are drinking a lot of alcohol or using illegal drugs. · You are talking or writing about death. Watch closely for changes in your health, and be sure to contact your doctor if:  · You find it hard or it's getting harder to deal with school, a job, family, or friends. · You think your treatment is not helping or you are not getting better. · Your symptoms get worse or you get new symptoms.   · You have any problems with your antidepressant medicines, such as side effects, or you are thinking about stopping your medicine. · You are having manic behavior, such as having very high energy, needing less sleep than normal, or showing risky behavior such as spending money you don't have or abusing others verbally or physically. Where can you learn more? Go to http://christopher-davida.info/  Enter C630 in the search box to learn more about \"Preventing a Relapse of Depression: Care Instructions. \"  Current as of: January 31, 2020               Content Version: 12.5  © 3571-3181 [employer]. Care instructions adapted under license by [employer] (which disclaims liability or warranty for this information). If you have questions about a medical condition or this instruction, always ask your healthcare professional. [place] [address] any warranty or liability for your use of this information. Patient Education        Stopping Smokeless Tobacco Use: Care Instructions  Your Care Instructions     Smokeless tobacco comes in many forms, such as snuff and chewing tobacco:  · Snuff is finely ground tobacco sold in cans or pouches. Most of the time, snuff is used by putting a \"pinch\" or \"dip\" between the lower lip or cheek and the gum. · Chewing tobacco is sold as loose leaves, plugs, or twists. It is chewed or placed between the cheek and the gum or teeth. There are plenty of reasons to stop using smokeless tobacco. These products are harmful. They are not risk-free alternatives to smoking. Smokeless tobacco contains nicotine, which is addicting. Though using smokeless tobacco is less harmful than smoking cigarettes, it can cause serious health problems, such as:  · White patches or red sores in your mouth that can turn into mouth cancer involving the lip, tongue, or cheek. · Tooth loss and other dental problems. · Gum disease.  Your gums may pull away from your teeth and not grow back. People who use smokeless tobacco crave the nicotine in it. Giving up smokeless tobacco is much harder than simply changing a habit. Your body has to stop craving the nicotine. It is hard to quit, but you can do it. Many tools are available for people who want to quit using smokeless tobacco. You may find that combining tools works best for you. There are several steps to quitting. First you get ready to quit. Then you get support to help you. After that, you learn new skills and behaviors to quit. For many people, a necessary step is getting and using medicine. Your doctor will help you set up the plan that best meets your needs. You may want to attend a tobacco cessation program. When you choose a program, look for one that has proven success. Ask your doctor for ideas. You will greatly increase your chances of success if you take medicine as well as get counseling or join a cessation program.  Some of the changes you feel when you first quit smokeless tobacco are uncomfortable. Your body will miss the nicotine at first, and you may feel short-tempered and grumpy. You may have trouble sleeping or concentrating. Medicine can help you deal with these symptoms. You may struggle with changing your habits and rituals. The last step is the tricky one: Be prepared for the urge to use smokeless tobacco to continue for a time. This is a lot to deal with, but keep at it. You will feel better. Follow-up care is a key part of your treatment and safety. Be sure to make and go to all appointments, and call your doctor if you are having problems. It's also a good idea to know your test results and keep a list of the medicines you take. How can you care for yourself at home? · Ask your family, friends, and coworkers for support. You have a better chance of quitting if you have help and support. · Join a support group for people who are trying to quit using smokeless tobacco.  · Set a quit date.  Pick your date carefully so that it is not right in the middle of a big deadline or stressful time. After you quit, do not use smokeless tobacco even once. Get rid of all spit cups, cans, and pouches after your last use. Clean your house and your clothes so that they do not smell of tobacco.  · Learn how to be a non-user. Think about ways you can avoid those things that make you reach for tobacco.  ? Learn some ways to deal with cravings, like calling a friend or going for a walk. Cravings often pass. ? Avoid situations that put you at greatest risk for using smokeless tobacco. For some people, it is hard to spend time with friends without dipping or chewing. For others, they might skip a coffee break with coworkers who smoke or use smokeless tobacco.  ? Change your daily routine. Take a different route to work, or eat a meal in a different place. · Cut down on stress. Calm yourself or release tension by doing an activity you enjoy, such as reading a book, taking a hot bath, or gardening. · Talk to your doctor or pharmacist about nicotine replacement therapy. You still get nicotine, but you do not use tobacco. Nicotine replacement products help you slowly reduce the amount of nicotine you need. Many of these products are available over the counter. They include nicotine patches, gum, lozenges, and inhalers. · Ask your doctor about bupropion (Wellbutrin) or varenicline (Chantix), which are prescription medicines. They do not contain nicotine. They help you by reducing withdrawal symptoms, such as stress and anxiety. · Get regular exercise. Having healthy habits will help your body move past its craving for nicotine. · Be prepared to keep trying. Most people are not successful the first few times they try to quit. Do not get mad at yourself if you use tobacco again. Make a list of things you learned, and think about when you want to try again, such as next week, next month, or next year. Where can you learn more?   Go to http://christopher-davida.info/  Enter [id number] in the search box to learn more about \"Stopping Smokeless Tobacco Use: Care Instructions. \"  Current as of: March 12, 2020               Content Version: 12.5  © 7282-9484 Healthwise, Incorporated. Care instructions adapted under license by [employer] (which disclaims liability or warranty for this information). If you have questions about a medical condition or this instruction, always ask your healthcare professional. [address] any warranty or liability for your use of this information.

## 2020-06-22 NOTE — ED NOTES
Pt came out to the Nurses station and rpts he is ready to be discharged and will be going to the [place] for a [address]. Pt refused d/c v/s. The patient was discharged home by [facility], RN  in stable condition. The patient is alert and oriented, in no respiratory distress. The patient's diagnosis, condition and treatment were explained. The patient expressed understanding. No prescriptions given. No work/school note given. A discharge plan has been developed. A  was not involved in the process. Aftercare instructions were given. Pt ambulatory out of the ED with family.

## 2020-06-22 NOTE — ED NOTES
Patient pacing about in the room, \"I can't have you touch me to start an IV. I can't get anything done to my chest either. \" He is agreeable to get an IM injection of Haldol at this time.  Dr Tere Mathew made aware

## 2020-12-19 ENCOUNTER — HOSPITAL ENCOUNTER (EMERGENCY)
Age: 35
Discharge: HOME | End: 2020-12-19
Attending: EMERGENCY MEDICINE

## 2020-12-19 ENCOUNTER — APPOINTMENT (OUTPATIENT)
Dept: CT IMAGING | Age: 35
End: 2020-12-19
Attending: EMERGENCY MEDICINE

## 2020-12-19 VITALS
DIASTOLIC BLOOD PRESSURE: 70 MMHG | BODY MASS INDEX: 32.74 KG/M2 | HEIGHT: 66 IN | RESPIRATION RATE: 16 BRPM | SYSTOLIC BLOOD PRESSURE: 116 MMHG | OXYGEN SATURATION: 96 % | TEMPERATURE: 97.8 F | WEIGHT: 203.71 LBS | HEART RATE: 76 BPM

## 2020-12-19 DIAGNOSIS — R51.9 ACUTE INTRACTABLE HEADACHE, UNSPECIFIED HEADACHE TYPE: Primary | ICD-10-CM

## 2020-12-19 PROCEDURE — 70450 CT HEAD/BRAIN W/O DYE: CPT

## 2020-12-19 PROCEDURE — 96374 THER/PROPH/DIAG INJ IV PUSH: CPT

## 2020-12-19 PROCEDURE — 74011250636 HC RX REV CODE- 250/636: Performed by: EMERGENCY MEDICINE

## 2020-12-19 PROCEDURE — 96375 TX/PRO/DX INJ NEW DRUG ADDON: CPT

## 2020-12-19 PROCEDURE — 99283 EMERGENCY DEPT VISIT LOW MDM: CPT

## 2020-12-19 RX ORDER — DIPHENHYDRAMINE HYDROCHLORIDE 50 MG/ML
25 INJECTION, SOLUTION INTRAMUSCULAR; INTRAVENOUS
Status: COMPLETED | OUTPATIENT
Start: 2020-12-19 | End: 2020-12-19

## 2020-12-19 RX ORDER — BUTALBITAL, ACETAMINOPHEN AND CAFFEINE 300; 40; 50 MG/1; MG/1; MG/1
1 CAPSULE ORAL
Qty: 12 CAP | Refills: 0 | Status: SHIPPED | OUTPATIENT
Start: 2020-12-19 | End: 2020-12-22

## 2020-12-19 RX ORDER — METOCLOPRAMIDE HYDROCHLORIDE 5 MG/ML
10 INJECTION INTRAMUSCULAR; INTRAVENOUS
Status: COMPLETED | OUTPATIENT
Start: 2020-12-19 | End: 2020-12-19

## 2020-12-19 RX ORDER — DEXAMETHASONE SODIUM PHOSPHATE 10 MG/ML
10 INJECTION INTRAMUSCULAR; INTRAVENOUS
Status: COMPLETED | OUTPATIENT
Start: 2020-12-19 | End: 2020-12-19

## 2020-12-19 RX ORDER — SUMATRIPTAN 100 MG/1
100 TABLET, FILM COATED ORAL
COMMUNITY

## 2020-12-19 RX ORDER — GABAPENTIN 300 MG/1
300 CAPSULE ORAL 3 TIMES DAILY
COMMUNITY
End: 2022-06-29

## 2020-12-19 RX ORDER — SUMATRIPTAN 6 MG/.5ML
6 INJECTION, SOLUTION SUBCUTANEOUS ONCE
COMMUNITY

## 2020-12-19 RX ORDER — BUSPIRONE HYDROCHLORIDE 10 MG/1
10 TABLET ORAL 2 TIMES DAILY
COMMUNITY
End: 2022-06-29

## 2020-12-19 RX ORDER — TERBINAFINE HYDROCHLORIDE 250 MG/1
250 TABLET ORAL DAILY
COMMUNITY
End: 2022-06-29

## 2020-12-19 RX ADMIN — SODIUM CHLORIDE 1000 ML: 900 INJECTION, SOLUTION INTRAVENOUS at 16:27

## 2020-12-19 RX ADMIN — DIPHENHYDRAMINE HYDROCHLORIDE 25 MG: 50 INJECTION, SOLUTION INTRAMUSCULAR; INTRAVENOUS at 16:33

## 2020-12-19 RX ADMIN — METOCLOPRAMIDE 10 MG: 5 INJECTION, SOLUTION INTRAMUSCULAR; INTRAVENOUS at 16:31

## 2020-12-19 RX ADMIN — DEXAMETHASONE SODIUM PHOSPHATE 10 MG: 10 INJECTION, SOLUTION INTRAMUSCULAR; INTRAVENOUS at 16:34

## 2020-12-19 NOTE — ED PROVIDER NOTES
79-year-old male with no significant past medical history presents with headache that started 2 weeks ago. He localizes it to the occipital region. He states that he does get chronic headaches but this 1 is different. He describes it as aching. He denies any focal weakness or numbness. He states when the pain gets really bad he does sweat. He denies any fevers or chills. He does not have a stiff neck. The headache has been a gradual onset. Headache   Pertinent negatives include no fever and no shortness of breath. Past Medical History:   Diagnosis Date    ADHD (attention deficit hyperactivity disorder)     Depression     Dyspepsia and other specified disorders of function of stomach     Epididymitis     Gastrointestinal disorder     GERD    Heat stroke     Musculoskeletal disorder        Past Surgical History:   Procedure Laterality Date    HX HEENT      right eye     HX VASECTOMY           Family History:   Problem Relation Age of Onset    Hypertension Father     High Cholesterol Father     Diabetes Maternal Grandfather     Heart Disease Maternal Grandfather     Heart Attack Maternal Grandfather     Stroke Maternal Grandfather     Heart Attack Paternal Grandfather     Stroke Paternal Grandfather        Social History     Socioeconomic History    Marital status:      Spouse name: Not on file    Number of children: Not on file    Years of education: Not on file    Highest education level: Not on file   Occupational History    Not on file   Social Needs    Financial resource strain: Not on file    Food insecurity     Worry: Not on file     Inability: Not on file    Transportation needs     Medical: Not on file     Non-medical: Not on file   Tobacco Use    Smoking status: Former Smoker     Packs/day: 0.50    Smokeless tobacco: Never Used    Tobacco comment: quit 2018   Substance and Sexual Activity    Alcohol use: Yes     Comment: occassionally    Drug use:  Yes Types: Marijuana    Sexual activity: Yes     Partners: Female   Lifestyle    Physical activity     Days per week: Not on file     Minutes per session: Not on file    Stress: Not on file   Relationships    Social connections     Talks on phone: Not on file     Gets together: Not on file     Attends Sabianist service: Not on file     Active member of club or organization: Not on file     Attends meetings of clubs or organizations: Not on file     Relationship status: Not on file    Intimate partner violence     Fear of current or ex partner: Not on file     Emotionally abused: Not on file     Physically abused: Not on file     Forced sexual activity: Not on file   Other Topics Concern    Not on file   Social History Narrative    Not on file         ALLERGIES: Toradol [ketorolac tromethamine]    Review of Systems   Constitutional: Negative for chills and fever. HENT: Negative for ear pain and sore throat. Eyes: Negative for pain. Respiratory: Negative for chest tightness and shortness of breath. Cardiovascular: Negative for chest pain. Gastrointestinal: Negative for abdominal pain. Genitourinary: Negative for flank pain. Musculoskeletal: Negative for back pain. Skin: Negative for rash. Neurological: Positive for headaches. All other systems reviewed and are negative. Vitals:    12/19/20 1502 12/19/20 1530 12/19/20 1713   BP: (!) 143/84 120/84 116/70   Pulse: 73     Resp: 16     Temp: 97.8 °F (36.6 °C)     SpO2: 98% 96%    Weight: 92.4 kg (203 lb 11.3 oz)     Height: 5' 6\" (1.676 m)              Physical Exam  Vitals signs and nursing note reviewed. Constitutional:       General: He is not in acute distress. HENT:      Head: Normocephalic and atraumatic. Eyes:      General: No scleral icterus. Conjunctiva/sclera: Conjunctivae normal.      Pupils: Pupils are equal, round, and reactive to light. Neck:      Musculoskeletal: No neck rigidity.       Trachea: No tracheal deviation. Cardiovascular:      Rate and Rhythm: Normal rate and regular rhythm. Pulmonary:      Effort: Pulmonary effort is normal. No respiratory distress. Breath sounds: Normal breath sounds. No stridor. Abdominal:      General: There is no distension. Palpations: Abdomen is soft. Tenderness: There is no abdominal tenderness. Genitourinary:     Comments: deferred  Musculoskeletal:         General: No deformity. Skin:     General: Skin is warm and dry. Neurological:      General: No focal deficit present. Mental Status: He is alert. Comments: Alert and Oriented x3, Cranial nerves 2-12 intact, normal motor and sensation, negative Romberg, no pronator drift, normal finger to nose   Psychiatric:         Mood and Affect: Mood normal.         Behavior: Behavior normal.          MDM  Number of Diagnoses or Management Options  Acute intractable headache, unspecified headache type  Diagnosis management comments: 79-year-old male with 2-week history of headache. Because his headache was different in his normal chronic headaches a CT scan was performed showing no acute abnormality. No indication for lumbar puncture as it is low risk for subarachnoid hemorrhage and no signs of meningitis or meningeal signs. Migraine cocktail did not help with headache although he could not take Toradol because it makes his reflux worse. Discharged in stable condition with referral to neurology. Neurologic exam nonfocal.         Procedures        5:20 PM  Patient re-evaluated. All questions answered. Patient appropriate for discharge. Given return precautions and follow up instructions. LABORATORY TESTS:  Labs Reviewed - No data to display    IMAGING RESULTS:  CT HEAD WO CONT   Final Result   IMPRESSION:    No acute abnormality.           MEDICATIONS GIVEN:  Medications   sodium chloride 0.9 % bolus infusion 1,000 mL (1,000 mL IntraVENous New Bag 12/19/20 5284)   dexamethasone (PF) (DECADRON) 10 mg/mL injection 10 mg (10 mg IntraVENous Given 12/19/20 1634)   diphenhydrAMINE (BENADRYL) injection 25 mg (25 mg IntraVENous Given 12/19/20 1633)   metoclopramide HCl (REGLAN) injection 10 mg (10 mg IntraVENous Given 12/19/20 1631)       IMPRESSION:  1. Acute intractable headache, unspecified headache type        PLAN:  1. Current Discharge Medication List      START taking these medications    Details   butalbital-acetaminophen-caff (Fioricet) -40 mg per capsule Take 1 Cap by mouth every four (4) hours as needed for Headache for up to 3 days. Qty: 12 Cap, Refills: 0           2. Follow-up Information     Follow up With Specialties Details Why [address]  Schedule an appointment as soon as possible for a visit   Yovany Conde Deirdre   [phone]    Ashutosh Gutierrez MD Neurology Schedule an appointment as soon as possible for a visit   [address] [place] [id number]  [phone]      [facility] DEPT Emergency Medicine  If symptoms worsen or new concerns [facility] [address] [facility]  [phone]        3. Return to ED for new or worsening symptoms       Willy Lewis.  Sue Aldridge MD

## 2020-12-19 NOTE — ED NOTES
The patient was discharged home by Dr Mamie Rodas in stable condition. The patient is alert and oriented, in no respiratory distress. The patient's diagnosis, condition and treatment were explained. The patient expressed understanding. A discharge plan has been developed. A  was not involved in the process. Aftercare instructions were given. Pt ambulatory out of the ED with family.

## 2020-12-19 NOTE — ED TRIAGE NOTES
Pt presents to ED with c/o occipital headache over the past two weeks. Pt states pain is increased with position changes and with exertion and radiates to both temples. Pt states when the headache increases in severity he has had dizziness and vomiting . Pt was seen by Dr Batool Pastor at [facility] about ten days ago Pt was put on course of Prednisone but is not better.

## 2020-12-19 NOTE — ED NOTES
Patient c/o further neck pain, \"not so much in the head. \" Dr Goldberg Grew made aware. Tolerated IVF's and all medications well.  Wife at bedside

## 2021-12-09 ENCOUNTER — TRANSCRIBE ORDER (OUTPATIENT)
Dept: SCHEDULING | Age: 36
End: 2021-12-09

## 2021-12-09 DIAGNOSIS — Z01.89 RADIOLOGICAL EXAMINATION, NOT ELSEWHERE CLASSIFIED: Primary | ICD-10-CM

## 2021-12-26 ENCOUNTER — HOSPITAL ENCOUNTER (OUTPATIENT)
Dept: MRI IMAGING | Age: 36
Discharge: HOME | End: 2021-12-26
Payer: OTHER GOVERNMENT

## 2021-12-26 DIAGNOSIS — Z01.89 RADIOLOGICAL EXAMINATION, NOT ELSEWHERE CLASSIFIED: ICD-10-CM

## 2021-12-26 PROCEDURE — 73221 MRI JOINT UPR EXTREM W/O DYE: CPT

## 2022-01-26 NOTE — ED NOTES
Discharge note: The patient was discharged home in stable condition. The patient is alert and oriented, is in no respiratory distress and has vital signs within normal limits. The patient's diagnosis, condition and treatment were explained to patient by Dr Brad Saleh and reinforced by nurse. The patient expressed understanding of discharge instructions and plan of care. A work note was given to pt. A discharge plan has been developed. A  was not involved in the process. Patient offered a wheelchair to ED lob for discharge but declined at this time. Patient ambulatory to ED lobby to go home. previous cystoscopy with bladder tumor removal on 10/28/2021 at [facility]. Per daughter, biopsy results were negative, have not been able to obtain records   - pt. found to have large clot in bladder causing distention. Urology following, s/p CBI, stopped 1/22  - mendez placed by urology- does not recommend TOV this admission  - urology originally planned for cystoscopy/ureteral stents on 1/25 but pt became HD unstable 2/2 SVT with RRT on 1/25 AM. anesthesia now more risky, recommending IR for PCN under local anesthesia  - IR deferring at this time, recs ureteral stents when pt more stable as less invasive/better quality of life    - c/w intermittent mendez irrigation at least once per shift and PRN until urine clears previous cystoscopy with bladder tumor removal on 10/28/2021 at [facility]. Per daughter, biopsy results were negative, have not been able to obtain records   - pt. found to have large clot in bladder causing distention. Urology following, s/p CBI, stopped 1/22  - mendez placed by urology- does not recommend TOV this admission  - urology originally planned for cystoscopy/ureteral stents on 1/25 but pt became HD unstable 2/2 SVT with RRT on 1/25 AM. anesthesia now more risky, recommending IR for PCN under local anesthesia  - IR deferring at this time, recs ureteral stents when pt more stable as less invasive/better quality of life    - c/w intermittent mendez irrigation at least once per shift and PRN until urine clears  - repeat renal US pending previous cystoscopy with bladder tumor removal on 10/28/2021 at [facility]. Per daughter, biopsy results were negative, have not been able to obtain records   - pt. found to have large clot in bladder causing distention. Urology following, s/p CBI, stopped 1/22  - mendez placed by urology- does not recommend TOV this admission  - urology originally planned for cystoscopy/ureteral stents on 1/25 but pt became HD unstable 2/2 SVT with RRT on 1/25 AM. anesthesia now more risky, recommending IR for PCN under local anesthesia  - IR deferring at this time, recs ureteral stents when pt more stable as less invasive/better quality of life    - uro to d/w IR, will f/u   - c/w intermittent mendez irrigation at least once per shift and PRN until urine clears  - repeat renal US pending to reassess b/l hydro

## 2022-05-18 ENCOUNTER — OFFICE VISIT (OUTPATIENT)
Dept: ORTHOPEDIC SURGERY | Age: 37
End: 2022-05-18
Payer: OTHER MISCELLANEOUS

## 2022-05-18 VITALS — WEIGHT: 205 LBS | BODY MASS INDEX: 32.95 KG/M2 | HEIGHT: 66 IN
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
## 2022-05-18 DIAGNOSIS — S93.601A RIGHT FOOT SPRAIN, INITIAL ENCOUNTER: Primary | ICD-10-CM

## 2022-05-18 DIAGNOSIS — M79.671 ACUTE FOOT PAIN, RIGHT: ICD-10-CM

## 2022-05-18 DIAGNOSIS — S99.921A RIGHT FOOT INJURY, INITIAL ENCOUNTER: ICD-10-CM

## 2022-05-18 PROCEDURE — 99203 OFFICE O/P NEW LOW 30 MIN: CPT | Performed by: ORTHOPAEDIC SURGERY

## 2022-05-18 PROCEDURE — L4387 NON-PNEUM WALK BOOT PRE OTS: HCPCS | Performed by: ORTHOPAEDIC SURGERY

## 2022-05-18 NOTE — PROGRESS NOTES
Aubree Rivera (: 1985) is a 39 y.o. male, patient,here for evaluation of the following   Chief Complaint   Patient presents with    Work Related Injury     he slipped while pulling down a trailer, he then slipped off the trailer. he was seen at [facility], this all happened on          ASSESSMENT/PLAN:  Below is the assessment and plan developed based on review of pertinent history, physical exam, labs, studies, and medications. 1. Right foot sprain, initial encounter  -     MRI FOOT RT WO CONT; Future  -     REFERRAL TO DME  -     [facility] NON-PNEUM WALK BOOT PRE OTS  2. Right foot injury, initial encounter  -     XR FOOT RT MIN 3 V; Future  -     MRI FOOT RT WO CONT; Future  -     REFERRAL TO DME  -     [facility] NON-PNEUM WALK BOOT PRE OTS  3. Acute foot pain, right  -     XR FOOT RT MIN 3 V; Future  -     MRI FOOT RT WO CONT; Future  -     REFERRAL TO DME  -     LA NON-PNEUM WALK BOOT PRE OTS  4. Work related injury  -     MRI FOOT RT WO CONT; Future    Patient has tenderness around the midfoot region of foot and could be a Lisfranc midfoot sprain only but there is a bone near the first and second TMT joint which could be an avulsion fracture versus an ossicle. Because he is tender at site and x-rays do not confirm an obvious fracture or dislocation, I do recommend an MRI without contrast of right foot to further evaluate. The postop shoe provided to him at [facility] urgent care where he presented does not fit properly and is not very helpful so I recommended a short boot which is provided to him today and patient fitted with a boot. He is okay to start weightbearing as tolerated in a couple weeks to see how it feels at its better, he progressed to full weightbearing in the boot. We will see him again in approximately 2 weeks to reassess and that we will be also to review the MRI which hopefully will be completed at that time since it was ordered stat.   Pending the results, it is likely he will be able to return to some type of work at 2 weeks follow-up. If there are any significant findings indicating a Lisfranc joint injury or sprain, I may obtain weightbearing x-rays of the right foot 3 views compared to contralateral if able to weight-bear at that time. Patient is provided a work status report form for employer and/or . Return in about 2 weeks (around 6/1/2022). Allergies   Allergen Reactions    Toradol [Ketorolac Tromethamine] Other (comments)     gastric reflux       Current Outpatient Medications   Medication Sig    busPIRone (BUSPAR) 10 mg tablet Take 10 mg by mouth two (2) times a day.  terbinafine HCL (LAMISIL) 250 mg tablet Take 250 mg by mouth daily.  gabapentin (Neurontin) 300 mg capsule Take 300 mg by mouth three (3) times daily.  SUMAtriptan (IMITREX) 100 mg tablet Take 100 mg by mouth once as needed for Migraine.  SUMAtriptan (Imitrex) 6 mg/0.5 mL injection 6 mg by SubCUTAneous route once.  atorvastatin (LIPITOR) 80 mg tablet Take 40 mg by mouth daily.  diclofenac EC (VOLTAREN) 75 mg EC tablet     hydrOXYzine HCL (ATARAX) 10 mg tablet     ibuprofen (MOTRIN) 200 mg tablet Take 800 mg by mouth daily.  propranoloL (INDERAL) 10 mg tablet     ondansetron (Zofran ODT) 8 mg disintegrating tablet Take 1 Tab by mouth every eight (8) hours as needed for Nausea or Vomiting.  diclofenac potassium (CATAFLAM) 50 mg tablet Take 50 mg by mouth as needed.  ondansetron (ZOFRAN ODT) 4 mg disintegrating tablet Take 1 Tab by mouth every eight (8) hours as needed for Nausea.  escitalopram oxalate (LEXAPRO) 20 mg tablet Take 30 mg by mouth daily.  baclofen (LIORESAL) 10 mg tablet Take  by mouth as needed.  lidocaine (XYLOCAINE) 4 % topical cream Apply  to affected area two (2) times daily as needed for Pain.  omeprazole (PRILOSEC) 20 mg capsule Take 40 mg by mouth daily. No current facility-administered medications for this visit.        Past Medical History:   Diagnosis Date    ADHD (attention deficit hyperactivity disorder)     Depression     Dyspepsia and other specified disorders of function of stomach     Epididymitis     Gastrointestinal disorder     GERD    Heat stroke     Musculoskeletal disorder        Past Surgical History:   Procedure Laterality Date    HX HEENT      right eye     HX VASECTOMY         Family History   Problem Relation Age of Onset    Hypertension Father     High Cholesterol Father     Diabetes Maternal Grandfather     Heart Disease Maternal Grandfather     Heart Attack Maternal Grandfather     Stroke Maternal Grandfather     Heart Attack Paternal Grandfather     Stroke Paternal Grandfather        Social History     Socioeconomic History    Marital status:      Spouse name: Not on file    Number of children: Not on file    Years of education: Not on file    Highest education level: Not on file   Occupational History    Not on file   Tobacco Use    Smoking status: Former Smoker     Packs/day: 0.50    Smokeless tobacco: Never Used    Tobacco comment: quit 2018   Substance and Sexual Activity    Alcohol use: Yes     Comment: occassionally    Drug use: Yes     Types: Marijuana    Sexual activity: Yes     Partners: Female   Other Topics Concern    Not on file   Social History Narrative    Not on file     Social Determinants of Health     Financial Resource Strain:     Difficulty of Paying Living Expenses: Not on file   Food Insecurity:     Worried About Running Out of Food in the Last Year: Not on file    [place] of Food in the Last Year: Not on file   Transportation Needs:     Lack of Transportation (Medical): Not on file    Lack of Transportation (Non-Medical):  Not on file   Physical Activity:     Days of Exercise per Week: Not on file    Minutes of Exercise per Session: Not on file   Stress:     Feeling of Stress : Not on file   Social Connections:     Frequency of Communication with Friends and Family: Not on file    Frequency of Social Gatherings with Friends and Family: Not on file    Attends Denominational Services: Not on file    Active Member of Clubs or Organizations: Not on file    Attends Club or Organization Meetings: Not on file    Marital Status: Not on file   Intimate Partner Violence:     Fear of Current or Ex-Partner: Not on file    Emotionally Abused: Not on file    Physically Abused: Not on file    Sexually Abused: Not on file   Housing Stability:     Unable to Pay for Housing in the Last Year: Not on file    Number of [place] in the Last Year: Not on file    Unstable Housing in the Last Year: Not on file           Vitals:  Ht 5' 6\" (1.676 m)   Wt 205 lb (93 kg)   BMI 33.09 kg/m²    Body mass index is 33.09 kg/m². SUBJECTIVE/OBJECTIVE:  Xochitl Rae (: 1985)   Worker's Comp. patient presents today with complaint of right foot pain related to an injury while he was closing a semitruck trailer, on May 2, 2022, he slipped and his foot got stuck in between the bumper and the vehicle and he had immediate pain, went to [facility] urgent care facility where x-rays were obtained. He was provided a postop shoe and crutches and referred to orthopedics for further evaluation and treatment. Presents today with complaint of right foot moderate pain that is sharp and throbbing and constant. There is swelling. Standing, walking, stairs and steps make it worse. Symptoms unchanged despite rest and ibuprofen. Patient has crutches and a postop shoe. States the postop shoe is not very helpful and its loose on his foot. He is not diabetic, non-smoker. Physical Exam  Pleasant well-nourished male , alert and oriented to person, time and place, no acute distress. Nonweightbearing gait, limited weightbearing stance. Right lower extremity/ankle: Calves are soft nontender, tendons and ligaments around the ankle are intact and nontender.   Negative Oswald test, negative ankle squeeze test.  There is full active and passive range of motion intact at the ankle. Right foot: There is diffuse tenderness to the foot including the area of midfoot and Lisfranc joint area, there is diffuse tenderness into the forefoot. There is mild swelling, resolved ecchymosis, no erythema or fluctuance. No gross instabilities. Contralateral foot and ankle exam, nontender, no swelling ligaments grossly stable. Normal weightbearing stance. Neurovascular exam intact for light touch sensation, cap refill, dorsalis pedis pulse palpable, flexion/extension strength 5/5. Skin intact without open wounds, lesions or ulcers, no skin discolorations, normal warmth to skin. Imaging:    XR Results (most recent):  Results from Appointment encounter on 05/18/22    XR FOOT RT MIN 3 V    Narrative  Right foot AP, lateral, oblique, nonweightbearing x-rays show possibly a small avulsion injury to the dorsal joint versus an ossicle. No dislocations seen, Lisfranc joint looks normal, sesamoids appear normal, no metatarsal fractures. An electronic signature was used to authenticate this note.   -- Eunice Boudreaux MD

## 2022-05-20 ENCOUNTER — HOSPITAL ENCOUNTER (OUTPATIENT)
Dept: MRI IMAGING | Age: 37
Discharge: HOME | End: 2022-05-20
Attending: ORTHOPAEDIC SURGERY
Payer: OTHER MISCELLANEOUS

## 2022-05-20 DIAGNOSIS — M79.671 ACUTE FOOT PAIN, RIGHT: ICD-10-CM
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
## 2022-05-20 DIAGNOSIS — S99.921A RIGHT FOOT INJURY, INITIAL ENCOUNTER: ICD-10-CM

## 2022-05-20 DIAGNOSIS — S93.601A RIGHT FOOT SPRAIN, INITIAL ENCOUNTER: ICD-10-CM

## 2022-05-20 PROCEDURE — 73718 MRI LOWER EXTREMITY W/O DYE: CPT

## 2022-05-25 ENCOUNTER — TRANSCRIBE ORDER (OUTPATIENT)
Dept: SCHEDULING | Age: 37
End: 2022-05-25

## 2022-05-25 DIAGNOSIS — K75.81 NONALCOHOLIC STEATOHEPATITIS: Primary | ICD-10-CM

## 2022-06-01 ENCOUNTER — OFFICE VISIT (OUTPATIENT)
Dept: ORTHOPEDIC SURGERY | Age: 37
End: 2022-06-01
Payer: OTHER MISCELLANEOUS

## 2022-06-01 VITALS — WEIGHT: 205 LBS | HEIGHT: 66 IN | BODY MASS INDEX: 32.95 KG/M2
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
## 2022-06-01 DIAGNOSIS — S93.602A FOOT SPRAIN, LEFT, INITIAL ENCOUNTER: Primary | ICD-10-CM

## 2022-06-01 PROCEDURE — 99213 OFFICE O/P EST LOW 20 MIN: CPT | Performed by: ORTHOPAEDIC SURGERY

## 2022-06-01 NOTE — PROGRESS NOTES
Gabrielle Whitt (: 1985) is a 39 y.o. male, patient,here for evaluation of the following   Chief Complaint   Patient presents with    Foot Pain     here to discuss MRI results on right foot. ASSESSMENT/PLAN:  Below is the assessment and plan developed based on review of pertinent history, physical exam, labs, studies, and medications. 1. Foot sprain, left, initial encounter  2. Work related injury    Patient is informed of the MRI findings, he does not appear to have a Lisfranc joint injury or fractures or dislocations at the foot, he is overall improving so this correlates with the study. He is however still having some pain so likely not ready to return to work as a . He will continue in the boot and start weightbearing as tolerated to full weightbearing in boot. Eventually will recommend physical therapy program to start after next evaluation. He can work on some range of motion exercises at home. He is not ready to return to work at this time. Work status report form is completed for patient to provide to employer and/or . Next time he returns we will get new x-rays right foot 3 views weightbearing compared to contralateral foot on AP view. Return in about 4 weeks (around 2022) for repeat xrays. Allergies   Allergen Reactions    Toradol [Ketorolac Tromethamine] Other (comments)     gastric reflux       Current Outpatient Medications   Medication Sig    busPIRone (BUSPAR) 10 mg tablet Take 10 mg by mouth two (2) times a day.  gabapentin (Neurontin) 300 mg capsule Take 300 mg by mouth three (3) times daily.  atorvastatin (LIPITOR) 80 mg tablet Take 40 mg by mouth daily.  hydrOXYzine HCL (ATARAX) 10 mg tablet     propranoloL (INDERAL) 10 mg tablet     escitalopram oxalate (LEXAPRO) 20 mg tablet Take 30 mg by mouth daily.  baclofen (LIORESAL) 10 mg tablet Take  by mouth as needed.     terbinafine HCL (LAMISIL) 250 mg tablet Take 250 mg by mouth daily. (Patient not taking: Reported on 6/1/2022)    SUMAtriptan (IMITREX) 100 mg tablet Take 100 mg by mouth once as needed for Migraine.  SUMAtriptan (Imitrex) 6 mg/0.5 mL injection 6 mg by SubCUTAneous route once.  diclofenac EC (VOLTAREN) 75 mg EC tablet  (Patient not taking: Reported on 6/1/2022)    ibuprofen (MOTRIN) 200 mg tablet Take 800 mg by mouth daily. (Patient not taking: Reported on 6/1/2022)    ondansetron (Zofran ODT) 8 mg disintegrating tablet Take 1 Tab by mouth every eight (8) hours as needed for Nausea or Vomiting. (Patient not taking: Reported on 6/1/2022)    diclofenac potassium (CATAFLAM) 50 mg tablet Take 50 mg by mouth as needed. (Patient not taking: Reported on 6/1/2022)    ondansetron (ZOFRAN ODT) 4 mg disintegrating tablet Take 1 Tab by mouth every eight (8) hours as needed for Nausea. (Patient not taking: Reported on 6/1/2022)    lidocaine (XYLOCAINE) 4 % topical cream Apply  to affected area two (2) times daily as needed for Pain.  omeprazole (PRILOSEC) 20 mg capsule Take 40 mg by mouth daily. (Patient not taking: Reported on 6/1/2022)     No current facility-administered medications for this visit.        Past Medical History:   Diagnosis Date    ADHD (attention deficit hyperactivity disorder)     Depression     Dyspepsia and other specified disorders of function of stomach     Epididymitis     Gastrointestinal disorder     GERD    Heat stroke     Musculoskeletal disorder        Past Surgical History:   Procedure Laterality Date    HX HEENT      right eye     HX VASECTOMY         Family History   Problem Relation Age of Onset    Hypertension Father     High Cholesterol Father     Diabetes Maternal Grandfather     Heart Disease Maternal Grandfather     Heart Attack Maternal Grandfather     Stroke Maternal Grandfather     Heart Attack Paternal Grandfather     Stroke Paternal Grandfather        Social History     Socioeconomic History    Marital status:      Spouse name: Not on file    Number of children: Not on file    Years of education: Not on file    Highest education level: Not on file   Occupational History    Not on file   Tobacco Use    Smoking status: Former Smoker     Packs/day: 0.50    Smokeless tobacco: Never Used    Tobacco comment: quit 2018   Substance and Sexual Activity    Alcohol use: Yes     Comment: occassionally    Drug use: Yes     Types: Marijuana    Sexual activity: Yes     Partners: Female   Other Topics Concern    Not on file   Social History Narrative    Not on file     Social Determinants of Health     Financial Resource Strain:     Difficulty of Paying Living Expenses: Not on file   Food Insecurity:     Worried About Running Out of Food in the Last Year: Not on file    [place] of Food in the Last Year: Not on file   Transportation Needs:     Lack of Transportation (Medical): Not on file    Lack of Transportation (Non-Medical):  Not on file   Physical Activity:     Days of Exercise per Week: Not on file    Minutes of Exercise per Session: Not on file   Stress:     Feeling of Stress : Not on file   Social Connections:     Frequency of Communication with Friends and Family: Not on file    Frequency of Social Gatherings with Friends and Family: Not on file    Attends Gnosticist Services: Not on file    Active Member of [address] [place] [address] Resource Data or Organizations: Not on file    Attends Club or Organization Meetings: Not on file    Marital Status: Not on file   Intimate Partner Violence:     Fear of Current or Ex-Partner: Not on file    Emotionally Abused: Not on file    Physically Abused: Not on file    Sexually Abused: Not on file   Housing Stability:     Unable to Pay for Housing in the Last Year: Not on file    Number of [place] in the Last Year: Not on file    Unstable Housing in the Last Year: Not on file           Vitals:  Ht 5' 6\" (1.676 m)   Wt 205 lb (93 kg)   BMI 33.09 kg/m²    Body mass index is 33.09 kg/m². SUBJECTIVE/OBJECTIVE:  Xochitl Rae (: 1985)   Worker's Comp. patient returns today for follow-up, he was initially seen for this injury on May 18, 2022. The injury happened when he was closing a semitruck trailer on May 2, 2022, he slipped and his right foot got stuck in between the bumper and the vehicle and he had immediate pain related to a twisting type of injury. Because concern for possible Lisfranc joint injury, he was referred for an MRI. The MRI is completed. He has no other complaints today and overall doing much better. Physical Exam  Pleasant well-nourished male , alert and oriented to person, time and place, no acute distress. Mild antalgic gait, satisfactory weightbearing stance. Right lower extremity/ankle: Calf soft nontender, ligament stable, normal exam.    Right foot: There is less swelling and tenderness to palpation overall, no malalignment or deformities, the area of the midfoot is still slightly tender but no gross instability at the Lisfranc joint. Neurovascular exam intact for light touch sensation, cap refill, dorsalis pedis pulse palpable, flexion/extension strength 5/5. Skin intact without open wounds, lesions or ulcers, no skin discolorations, normal warmth to skin. Imaging:    No x-rays were obtained today but reviewed the MRI without contrast obtained on May 20, 2022, the films were reviewed on PACS, does not show an obvious Lisfranc joint ligament injury, no fractures or dislocations. There is mild first MTP joint arthritis and there is some swelling from the dorsal soft tissue contusion. No dislocations. The radiology report is reviewed in PACS, the summary is below. IMPRESSION     1. No fracture or bone contusion. Subtle dorsal soft tissue contusion. 2. Mild first MTP joint osteoarthritis. An electronic signature was used to authenticate this note.   -- Michelle Narayanan MD

## 2022-06-24 ENCOUNTER — HOSPITAL ENCOUNTER (OUTPATIENT)
Dept: ULTRASOUND IMAGING | Age: 37
Discharge: HOME | End: 2022-06-24
Attending: INTERNAL MEDICINE

## 2022-06-24 VITALS
TEMPERATURE: 98.4 F | SYSTOLIC BLOOD PRESSURE: 161 MMHG | DIASTOLIC BLOOD PRESSURE: 96 MMHG | WEIGHT: 210 LBS | BODY MASS INDEX: 33.75 KG/M2 | OXYGEN SATURATION: 96 % | HEART RATE: 73 BPM | HEIGHT: 66 IN | RESPIRATION RATE: 13 BRPM

## 2022-06-24 DIAGNOSIS — K75.81 NONALCOHOLIC STEATOHEPATITIS: ICD-10-CM

## 2022-06-24 PROCEDURE — 74011250636 HC RX REV CODE- 250/636: Performed by: RADIOLOGY

## 2022-06-24 PROCEDURE — 77030014115

## 2022-06-24 PROCEDURE — 74011250637 HC RX REV CODE- 250/637: Performed by: PHYSICIAN ASSISTANT

## 2022-06-24 PROCEDURE — 74011000250 HC RX REV CODE- 250: Performed by: PHYSICIAN ASSISTANT

## 2022-06-24 PROCEDURE — 77030003666 HC NDL SPINAL BD -A

## 2022-06-24 PROCEDURE — 2709999900 HC NON-CHARGEABLE SUPPLY

## 2022-06-24 PROCEDURE — 77030014007 HC SPNG HEMSTAT J&J -B

## 2022-06-24 PROCEDURE — 47000 NEEDLE BIOPSY OF LIVER PERQ: CPT

## 2022-06-24 RX ORDER — MIDAZOLAM HYDROCHLORIDE 1 MG/ML
4 INJECTION, SOLUTION INTRAMUSCULAR; INTRAVENOUS
Status: DISCONTINUED | OUTPATIENT
Start: 2022-06-24 | End: 2022-06-28 | Stop reason: HOSPADM

## 2022-06-24 RX ORDER — LIDOCAINE HYDROCHLORIDE 10 MG/ML
5 INJECTION, SOLUTION EPIDURAL; INFILTRATION; INTRACAUDAL; PERINEURAL
Status: COMPLETED | OUTPATIENT
Start: 2022-06-24 | End: 2022-06-24

## 2022-06-24 RX ORDER — ACETAMINOPHEN 500 MG
500 TABLET ORAL
Status: COMPLETED | OUTPATIENT
Start: 2022-06-24 | End: 2022-06-24

## 2022-06-24 RX ORDER — SODIUM BICARBONATE 42 MG/ML
1 INJECTION, SOLUTION INTRAVENOUS
Status: COMPLETED | OUTPATIENT
Start: 2022-06-24 | End: 2022-06-24

## 2022-06-24 RX ORDER — FENTANYL CITRATE 50 UG/ML
200 INJECTION, SOLUTION INTRAMUSCULAR; INTRAVENOUS
Status: DISCONTINUED | OUTPATIENT
Start: 2022-06-24 | End: 2022-06-28 | Stop reason: HOSPADM

## 2022-06-24 RX ADMIN — MIDAZOLAM 1 MG: 1 INJECTION INTRAMUSCULAR; INTRAVENOUS at 11:37

## 2022-06-24 RX ADMIN — SODIUM BICARBONATE 1 MG: 42 INJECTION, SOLUTION INTRAVENOUS at 11:37

## 2022-06-24 RX ADMIN — FENTANYL CITRATE 50 MCG: 0.05 INJECTION, SOLUTION INTRAMUSCULAR; INTRAVENOUS at 11:37

## 2022-06-24 RX ADMIN — MIDAZOLAM 1 MG: 1 INJECTION INTRAMUSCULAR; INTRAVENOUS at 11:27

## 2022-06-24 RX ADMIN — ACETAMINOPHEN 500 MG: 500 TABLET ORAL at 13:59

## 2022-06-24 RX ADMIN — FENTANYL CITRATE 50 MCG: 0.05 INJECTION, SOLUTION INTRAMUSCULAR; INTRAVENOUS at 11:27

## 2022-06-24 RX ADMIN — LIDOCAINE HYDROCHLORIDE 5 ML: 10 INJECTION, SOLUTION EPIDURAL; INFILTRATION; INTRACAUDAL; PERINEURAL at 11:37

## 2022-06-24 RX ADMIN — ACETAMINOPHEN 500 MG: 500 TABLET ORAL at 12:12

## 2022-06-24 RX ADMIN — FENTANYL CITRATE 50 MCG: 0.05 INJECTION, SOLUTION INTRAMUSCULAR; INTRAVENOUS at 11:32

## 2022-06-24 RX ADMIN — MIDAZOLAM 1 MG: 1 INJECTION INTRAMUSCULAR; INTRAVENOUS at 11:32

## 2022-06-24 NOTE — H&P
INTERVENTIONAL RADIOLOGY  Preoperative History and Physical      Patient:  Neetu Merlos  :  1985  Age:  39 y.o. MRN:  [id number]  Today's Date:  2022      CC / HPI   Neetu Merlos is a 39 y.o. male with a history of nonalcoholic steatohepatitis  who presents for CT guided nontargeted liver biopsy. PAST MEDICAL HISTORY  Past Medical History:   Diagnosis Date    ADHD (attention deficit hyperactivity disorder)     Depression     Dyspepsia and other specified disorders of function of stomach     Epididymitis     Gastrointestinal disorder     GERD    Heat stroke     Musculoskeletal disorder        PAST SURGICAL HISTORY  Past Surgical History:   Procedure Laterality Date    HX HEENT      right eye     HX VASECTOMY         SOCIAL HISTORY  Social History     Socioeconomic History    Marital status:      Spouse name: Not on file    Number of children: Not on file    Years of education: Not on file    Highest education level: Not on file   Occupational History    Not on file   Tobacco Use    Smoking status: Former Smoker     Packs/day: 0.50    Smokeless tobacco: Never Used    Tobacco comment: quit 2018   Substance and Sexual Activity    Alcohol use: Yes     Comment: occassionally    Drug use: Yes     Types: Marijuana    Sexual activity: Yes     Partners: Female   Other Topics Concern    Not on file   Social History Narrative    Not on file     Social Determinants of Health     Financial Resource Strain:     Difficulty of Paying Living Expenses: Not on file   Food Insecurity:     Worried About Running Out of Food in the Last Year: Not on file    Wisam of Food in the Last Year: Not on file   Transportation Needs:     Lack of Transportation (Medical): Not on file    Lack of Transportation (Non-Medical):  Not on file   Physical Activity:     Days of Exercise per Week: Not on file    Minutes of Exercise per Session: Not on file   Stress:     Feeling of Stress : Not on file   Social Connections:     Frequency of Communication with Friends and Family: Not on file    Frequency of Social Gatherings with Friends and Family: Not on file    Attends Yazidism Services: Not on file    Active Member of [address] [place] [address] [email or web address] or Organizations: Not on file    Attends Club or Organization Meetings: Not on file    Marital Status: Not on file   Intimate Partner Violence:     Fear of Current or Ex-Partner: Not on file    Emotionally Abused: Not on file    Physically Abused: Not on file    Sexually Abused: Not on file   Housing Stability:     Unable to Pay for Housing in the Last Year: Not on file    Number of [place] in the Last Year: Not on file    Unstable Housing in the Last Year: Not on file       FAMILY HISTORY  Family History   Problem Relation Age of Onset    Hypertension Father     High Cholesterol Father     Diabetes Maternal Grandfather     Heart Disease Maternal Grandfather     Heart Attack Maternal Grandfather     Stroke Maternal Grandfather     Heart Attack Paternal Grandfather     Stroke Paternal Grandfather        CURRENT MEDICATIONS  Current Outpatient Medications   Medication Sig Dispense Refill    atorvastatin (LIPITOR) 80 mg tablet Take 40 mg by mouth daily.  ondansetron (ZOFRAN ODT) 4 mg disintegrating tablet Take 1 Tab by mouth every eight (8) hours as needed for Nausea. 12 Tab 0    escitalopram oxalate (LEXAPRO) 20 mg tablet Take 30 mg by mouth daily.  baclofen (LIORESAL) 10 mg tablet Take  by mouth as needed.  omeprazole (PRILOSEC) 20 mg capsule Take 40 mg by mouth daily.  busPIRone (BUSPAR) 10 mg tablet Take 10 mg by mouth two (2) times a day. (Patient not taking: Reported on 6/24/2022)      terbinafine HCL (LAMISIL) 250 mg tablet Take 250 mg by mouth daily. (Patient not taking: Reported on 6/1/2022)      gabapentin (Neurontin) 300 mg capsule Take 300 mg by mouth three (3) times daily.  (Patient not taking: Reported on 6/24/2022)      SUMAtriptan (IMITREX) 100 mg tablet Take 100 mg by mouth once as needed for Migraine.  SUMAtriptan (Imitrex) 6 mg/0.5 mL injection 6 mg by SubCUTAneous route once.  diclofenac EC (VOLTAREN) 75 mg EC tablet  (Patient not taking: Reported on 6/1/2022)      hydrOXYzine HCL (ATARAX) 10 mg tablet  (Patient not taking: Reported on 6/24/2022)      ibuprofen (MOTRIN) 200 mg tablet Take 800 mg by mouth daily. (Patient not taking: Reported on 6/1/2022)      propranoloL (INDERAL) 10 mg tablet  (Patient not taking: Reported on 6/24/2022)      ondansetron (Zofran ODT) 8 mg disintegrating tablet Take 1 Tab by mouth every eight (8) hours as needed for Nausea or Vomiting. (Patient not taking: Reported on 6/1/2022) 15 Tab 0    diclofenac potassium (CATAFLAM) 50 mg tablet Take 50 mg by mouth as needed. (Patient not taking: Reported on 6/1/2022)      lidocaine (XYLOCAINE) 4 % topical cream Apply  to affected area two (2) times daily as needed for Pain. (Patient not taking: Reported on 6/24/2022) 15 g 0     Current Facility-Administered Medications   Medication Dose Route Frequency Provider Last Rate Last Admin    fentaNYL citrate (PF) injection 200 mcg  200 mcg IntraVENous Rad Jakob Johnston MD        midazolam (VERSED) injection 4 mg  4 mg IntraVENous Rad Jakob Johnston MD           ALLERGIES  Allergies   Allergen Reactions    Toradol [Ketorolac Tromethamine] Other (comments)     gastric reflux       DIAGNOSTIC STUDIES   IMAGING STUDIES  I personally reviewed the following studies:  US Results (most recent):  Results from [place] encounter on 11/21/19    US ABD LTD    Narrative  ULTRASOUND OF ABDOMEN, 11/21/2019 4:18 PM  INDICATION:  Additional history: Right upper quadrant abdominal pain  COMPARISON: None  . TECHNIQUE: Multiplanar real-time ultrasonography of the abdomen using gray-scale  imaging, supplemented by color and spectral Doppler. Marcellus Escalera   FINDINGS:  Liver: Normal contour without visible focal lesions. The liver echotexture is  increased. Antegrade flow in the portal vein with normal waveform. Gallbladder: Decompressed No stones, wall thickening or pericholecystic fluid  collections. No sonographic Corrigan's sign. Biliary: No intrahepatic ductal dilatation. Common bile duct measures 0.3 cm. Pancreas: The visualized pancreas is unremarkable. Right kidney: Normal size, contour and echogenicity without masses, stones,  perinephric fluid, or hydronephrosis. Right kidney measures 11.4 cm. Vascular: The proximal aorta and IVC are unremarkable. .    Impression  IMPRESSION:  1. Increased echogenicity of the liver, nonspecific, often representing hepatic  steatosis, seen in other diffuse hepatocellular processes.       LABS  Lab Results   Component Value Date/Time    WBC 6.5 05/16/2020 08:16 PM    HGB 15.8 05/16/2020 08:16 PM    HCT 45.4 05/16/2020 08:16 PM    PLATELET 220 67/08/1181 08:16 PM    MCV 89.9 05/16/2020 08:16 PM     Lab Results   Component Value Date/Time    Sodium 136 05/16/2020 08:16 PM    Potassium 3.7 05/16/2020 08:16 PM    Chloride 105 05/16/2020 08:16 PM    CO2 26 05/16/2020 08:16 PM    Anion gap 5 05/16/2020 08:16 PM    Glucose 91 05/16/2020 08:16 PM    BUN 15 05/16/2020 08:16 PM    Creatinine 0.99 05/16/2020 08:16 PM    BUN/Creatinine ratio 15 05/16/2020 08:16 PM    GFR est AA >60 05/16/2020 08:16 PM    GFR est non-AA >60 05/16/2020 08:16 PM    Calcium 9.0 05/16/2020 08:16 PM     No results found for: INR, PTMR, PTP, PT1, PT2, INREXT    PHYSICAL EXAM   /87 (BP 1 Location: Left upper arm, BP Patient Position: At rest;Supine)   Pulse 74   Temp 98.4 °F (36.9 °C)   Resp 14   Ht 5' 6\" (1.676 m)   Wt 95.3 kg (210 lb)   SpO2 96%   BMI 33.89 kg/m²   General:  NAD  Heart:  RRR  Lungs:  NWOB  Neurological:  AAOX3    PLAN   Procedure to be performed:  CT guided nontargeted liver biopsy  Plan for sedation:  moderate  Post procedure plan:  observation per protocol  Informed consent:  risks, benefits, and alternatives reviewed with the patient / family who agree to proceed  Code status:  No Order      Arabella Cardona, [address] [facility]

## 2022-06-24 NOTE — PROGRESS NOTES
Vss.  Pt back to baseline mentation. Discharge instructions reviewed with patient and questions answered at this time. IV d/c. Pt taken via wheelchair, steady gait noted to pov.

## 2022-06-24 NOTE — PROGRESS NOTES
Pt arrives ambulaotry  to angio department  for us guided liver biopsy procedure. All assessments completed and consent was reviewed. Education given was regarding procedure, conscious  sedation, post-procedure care and  management/follow-up. Opportunity for questions was provided and all questions and concerns were addressed.

## 2022-06-24 NOTE — DISCHARGE INSTRUCTIONS
Patient Education        Percutaneous Liver Biopsy: What to Expect at Home  Your Recovery  A needle biopsy of the liver is a procedure to take a tiny sample (biopsy) of your liver tissue. The tissue sample is looked at under a microscope. Your doctor can look for infection or other liver problems. You may have some pain where the biopsy needle entered your skin (the puncture site). You may also have pain in your shoulder. This is called referred pain. It is caused by pain traveling along a nerve near the biopsy site. The referred pain usually lasts less than 12 hours. You may have a small amount of bleeding from the puncture site. You can probably go home if you have no problems after the test. You will need to take it easy at home for 1 or 2 days after the procedure. You will probably be able to return to work and most of your usual activities after that. This care sheet gives you a general idea about how long it will take for you to recover. But each person recovers at a different pace. Follow the steps below to get better as quickly as possible. How can you care for yourself at home? Activity    · Rest when you feel tired. Getting enough sleep will help you recover.     · Try to walk each day. Start by walking a little more than you did the day before. Bit by bit, increase the amount you walk. Walking boosts blood flow and helps prevent pneumonia and constipation.     · Avoid exercises that use your belly muscles and strenuous activities, such as bicycle riding, jogging, weight lifting, or aerobic exercise, for 1 week or until your doctor says it is okay.     · Ask your doctor when you can drive again.     · You will probably need to take 1 or 2 days off from work. It depends on the type of work you do and how you feel.     · You will probably be able to shower the same day as the test, if your doctor says it is okay. Pat the puncture site dry.  Do not take a bath for at least 2 days after the test, or until your doctor tells you it is okay. Diet    · You can eat your normal diet. If your stomach is upset, try bland, low-fat foods like plain rice, broiled chicken, toast, and yogurt.     · Drink plenty of fluids (unless your doctor tells you not to). Medicines    · Your doctor will tell you if and when you can restart your medicines. He or she will also give you instructions about taking any new medicines.     · If you take aspirin or some other blood thinner, ask your doctor if and when to start taking it again. Make sure that you understand exactly what your doctor wants you to do.     · Be safe with medicines. Take pain medicines exactly as directed. ? If the doctor gave you a prescription medicine for pain, take it as prescribed. ? If you are not taking a prescription pain medicine, take an over-the-counter medicine that your doctor recommends. Read and follow all instructions on the label. ? Do not take aspirin, ibuprofen (Advil, Motrin), naproxen (Aleve), or other nonsteroidal anti-inflammatory drugs (NSAIDs) unless your doctor says it is okay.     · If you think your pain medicine is making you sick to your stomach:  ? Take your medicine after meals (unless your doctor has told you not to). ? Ask your doctor for a different kind of pain medicine. Care of the puncture site    · Keep a bandage over the puncture site for the first 1 or 2 days. Follow-up care is a key part of your treatment and safety. Be sure to make and go to all appointments, and call your doctor if you are having problems. It's also a good idea to know your test results and keep a list of the medicines you take. When should you call for help? Call 911 anytime you think you may need emergency care.  For example, call if:    · You passed out (lost consciousness).     · You have severe trouble breathing.     · You have sudden chest pain and shortness of breath, or you cough up blood.     · You have severe pain in your chest, shoulder, or belly. Call your doctor now or seek immediate medical care if:    · You have new or worse shortness of breath.     · Bright red blood has soaked through the bandage over the puncture site.     · You have pain that does not get better after you take your pain medicine.     · You are sick to your stomach or cannot keep fluids down.     · You have a fever, chills, or body aches.     · You have signs of infection, such as:  ? Increased pain, swelling, warmth, or redness. ? Red streaks leading from the puncture site. ? Pus draining from the puncture site. ? A fever.     · You have new or worse pain at the puncture site.     · You have new or worse belly swelling or bloating.     · You have trouble passing urine or stool.     · Your stools are black and tarlike or have streaks of blood.     · You have pale-colored stools along with dark urine and itching. Watch closely for changes in your health, and be sure to contact your doctor if you have any problems. Where can you learn more? Go to http://www.gray.com/  Enter Y064 in the search box to learn more about \"Percutaneous Liver Biopsy: What to Expect at Home. \"  Current as of: June 17, 2021               Content Version: 13.2  © 2006-2022 [employer]. Care instructions adapted under license by [employer] (which disclaims liability or warranty for this information). If you have questions about a medical condition or this instruction, always ask your healthcare professional. [place] [address] any warranty or liability for your use of this information. Sedation for a Medical Procedure: Care Instructions  Your Care Instructions  For a minor procedure or surgery, you will get a sedative to help you relax. This drug will make you sleepy. It is usually given in a vein (by IV). A shot may also be used to numb the area.   If you had local anesthesia, you may feel some pain and discomfort as it wears off. If you have pain, don't be afraid to say so. Pain medicine works better if you take it before the pain gets bad. Common side effects from sedation include:  · Feeling sleepy. (Your doctors and nurses will make sure you are not too sleepy to go home.)  · Nausea and vomiting. This usually does not last long. · Feeling tired. Follow-up care is a key part of your treatment and safety. Be sure to make and go to all appointments, and call your doctor if you are having problems. It's also a good idea to know your test results and keep a list of the medicines you take. How can you care for yourself at home? Activity    · Don't do anything for 24 hours that requires attention to detail. It takes time for the medicine effects to completely wear off. · Do not make important legal decisions for 24 hours. · Do not sign any legal documents for 24 hours. · Do not drink alcohol today     · For your safety, you should not drive or operate heavy machinery for the remainder of the day     · Rest when you feel tired. Getting enough sleep will help you recover. Diet    · You can eat your normal diet, unless your doctor gives you other instructions. If your stomach is upset, try clear liquids and bland, low-fat foods like plain toast or rice. · Drink plenty of fluids (unless your doctor tells you not to). · Don't drink alcohol for 24 hours. Medicines    · Be safe with medicines. Read and follow all instructions on the label. ? If the doctor gave you a prescription medicine for pain, take it as prescribed. ? If you are not taking a prescription pain medicine, ask your doctor if you can take an over-the-counter medicine. · If you think your pain medicine is making you sick to your stomach:  ? Take your medicine after meals (unless your doctor has told you not to). ? Ask your doctor for a different pain medicine. When should you call for help?   Call 911 anytime you think you may need emergency care. For example, call if:    · You have severe trouble breathing. · You passed out (lost consciousness). Call your doctor now or seek immediate medical care if:    · You have trouble breathing. · You have ongoing or worsening nausea or vomiting. · You have a fever. · You have a new or worse headache. · The medicine is not wearing off and you can't think clearly. Watch closely for changes in your health, and be sure to contact your doctor if:    · You do not get better as expected. Where can you learn more? Go to http://www.gray.com/.

## 2022-06-29 ENCOUNTER — OFFICE VISIT (OUTPATIENT)
Dept: ORTHOPEDIC SURGERY | Age: 37
End: 2022-06-29
Payer: OTHER MISCELLANEOUS

## 2022-06-29 VITALS — WEIGHT: 210 LBS | HEIGHT: 66 IN | BODY MASS INDEX: 33.75 KG/M2

## 2022-06-29 DIAGNOSIS — S93.602D FOOT SPRAIN, LEFT, SUBSEQUENT ENCOUNTER: Primary | ICD-10-CM
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
## 2022-06-29 DIAGNOSIS — M79.671 RIGHT FOOT PAIN: ICD-10-CM

## 2022-06-29 PROCEDURE — 99213 OFFICE O/P EST LOW 20 MIN: CPT | Performed by: ORTHOPAEDIC SURGERY

## 2022-06-29 RX ORDER — OLANZAPINE 5 MG/1
TABLET ORAL
COMMUNITY
Start: 2022-04-08

## 2022-06-29 NOTE — PROGRESS NOTES
Will Muñiz (: 1985) is a 39 y.o. male, patient,here for evaluation of the following   Chief Complaint   Patient presents with    Foot Pain     right foot        ASSESSMENT/PLAN:  Below is the assessment and plan developed based on review of pertinent history, physical exam, labs, studies, and medications. 1. Foot sprain, left, subsequent encounter  -     XR STANDING FOOT RT MIN 3 V; Future  2. Work related injury  3. Right foot pain    Patient is doing much better but still has little bit of soreness around the fourth and fifth TMT joints, we have obtained an MRI in the past on May 20, 2022 to determine whether there were any other injuries however those studies did not show a fracture or bone contusion but there was subtle soft tissue contusion. 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 I suspect there was a ligament injury that still present causing some symptoms. However, he can return to full duty work at this time with the exception of using sneakers instead of a boot for driving activities. He is provided a return to work release stating that it is okay for him to drive now and sneakers. I did recommend no lifting over 30 pounds. We will see him again in approxi-4 weeks since he is not fully recovered. No x-rays are needed however. Anticipate MMI after 3 months since date of injury. Is not at [address] [place] today. A work note was also written separately also states to allow driving in a sneaker which is is more comfortable and fits much better than his work boots. The work status report form is completed for patient to give to employer/. Return in about 4 weeks (around 2022).       Allergies   Allergen Reactions    Toradol [Ketorolac Tromethamine] Other (comments)     gastric reflux       Current Outpatient Medications   Medication Sig    OLANZapine (ZyPREXA) 5 mg tablet     SUMAtriptan (IMITREX) 100 mg tablet Take 100 mg by mouth once as needed for Migraine.  SUMAtriptan (Imitrex) 6 mg/0.5 mL injection 6 mg by SubCUTAneous route once.  atorvastatin (LIPITOR) 80 mg tablet Take 40 mg by mouth daily.  ondansetron (Zofran ODT) 8 mg disintegrating tablet Take 1 Tab by mouth every eight (8) hours as needed for Nausea or Vomiting.  escitalopram oxalate (LEXAPRO) 20 mg tablet Take 30 mg by mouth daily.  baclofen (LIORESAL) 10 mg tablet Take  by mouth as needed.  lidocaine (XYLOCAINE) 4 % topical cream Apply  to affected area two (2) times daily as needed for Pain.  omeprazole (PRILOSEC) 20 mg capsule Take 40 mg by mouth daily. No current facility-administered medications for this visit.        Past Medical History:   Diagnosis Date    ADHD (attention deficit hyperactivity disorder)     Depression     Dyspepsia and other specified disorders of function of stomach     Epididymitis     Gastrointestinal disorder     GERD    Heat stroke     Musculoskeletal disorder        Past Surgical History:   Procedure Laterality Date    HX HEENT      right eye     HX VASECTOMY         Family History   Problem Relation Age of Onset    Hypertension Father     High Cholesterol Father     Diabetes Maternal Grandfather     Heart Disease Maternal Grandfather     Heart Attack Maternal Grandfather     Stroke Maternal Grandfather     Heart Attack Paternal Grandfather     Stroke Paternal Grandfather        Social History     Socioeconomic History    Marital status:      Spouse name: Not on file    Number of children: Not on file    Years of education: Not on file    Highest education level: Not on file   Occupational History    Not on file   Tobacco Use    Smoking status: Former Smoker     Packs/day: 0.50    Smokeless tobacco: Never Used    Tobacco comment: quit 2018   Substance and Sexual Activity    Alcohol use: Yes     Comment: occassionally    Drug use: Yes     Types: Marijuana    Sexual activity: Yes     Partners: Female   Other Topics Concern    Not on file   Social History Narrative    Not on file     Social Determinants of Health     Financial Resource Strain:     Difficulty of Paying Living Expenses: Not on file   Food Insecurity:     Worried About Running Out of Food in the Last Year: Not on file    [place] of Food in the Last Year: Not on file   Transportation Needs:     Lack of Transportation (Medical): Not on file    Lack of Transportation (Non-Medical): Not on file   Physical Activity:     Days of Exercise per Week: Not on file    Minutes of Exercise per Session: Not on file   Stress:     Feeling of Stress : Not on file   Social Connections:     Frequency of Communication with Friends and Family: Not on file    Frequency of Social Gatherings with Friends and Family: Not on file    Attends [employer] Services: Not on file    Active Member of [address] [place] [address] [employer] or Organizations: Not on file    Attends Club or Organization Meetings: Not on file    Marital Status: Not on file   Intimate Partner Violence:     Fear of Current or Ex-Partner: Not on file    Emotionally Abused: Not on file    Physically Abused: Not on file    Sexually Abused: Not on file   Housing Stability:     Unable to Pay for Housing in the Last Year: Not on file    Number of [place] in the Last Year: Not on file    Unstable Housing in the Last Year: Not on file           Vitals:  Ht 5' 6\" (1.676 m)   Wt 210 lb (95.3 kg)   BMI 33.89 kg/m²    Body mass index is 33.89 kg/m². SUBJECTIVE/OBJECTIVE:  Yi Peters (: 1985)   Worker's Comp. patient returns today for reevaluation of the left foot injury, suspected possible fracture of the foot however MRI previously obtained did not show an obvious fracture so this was considered a right foot sprain. Patient is status post injury on May 2, 2022. He has been unable to drive due to the injury. He states he would like to return to some type of work.   No other complaints today.      Physical Exam  Pleasant well-nourished male , alert and oriented to person, time and place, no acute distress. Mild antalgic gait,  satisfactory weightbearing stance. Right lower extremity/ankle: Full active and passive range of motion intact, nontender, ligaments stable, normal examined ankle. Right foot: There is no swelling to the foot, normal alignment without deformities, however  around the fourth and fifth TMT joints and near the cuboid area. There is no significant swelling there however. No ecchymosis, erythema or fluctuance. Neurovascular exam intact for light touch sensation, cap refill, dorsalis pedis pulse palpable, flexion/extension strength 5/5. Skin intact without open wounds, lesions or ulcers, no skin discolorations, normal warmth to skin. Imaging:    XR Results (most recent):  Results from Appointment encounter on 06/29/22    XR STANDING FOOT RT MIN 3 V    Narrative  Right foot standing AP, lateral and oblique x-rays show satisfactory alignment on 3 views, there is no obvious fractures or dislocations, the tarsometatarsal joints at the medial Lisfranc joint appears mostly normal.  No malalignment or deformity seen. Satisfactory bone density.

## 2022-06-29 NOTE — LETTER
6/29/2022    Patient: Main Cadet   YOB: 1985   Date of Visit: 6/29/2022     Kristen [address] [place] [id number]  Via Fax: [phone]    Dear Rebecca Minaya MD,      Thank you for referring Mr. Aaron Frankel to [facility] for evaluation. My notes for this consultation are attached. If you have questions, please do not hesitate to call me. I look forward to following your patient along with you.       Sincerely,    Shanelle Ohara MD

## 2022-06-29 NOTE — LETTER
6/29/2022 1:28 PM    Mr. Troy Rabago  [address] [facility] [address]      Mr. Margy Machuca was seen in the office today by Dr. Travis Fu. He is able to go to work provided he is able to wear a sneaker when driving. If there are any questions or concerns, please have the patient reach out to office at [phone].       Sincerely,      Travis Fu MD

## 2022-08-12 ENCOUNTER — OFFICE VISIT (OUTPATIENT)
Dept: ORTHOPEDIC SURGERY | Age: 37
End: 2022-08-12
Payer: OTHER MISCELLANEOUS

## 2022-08-12 VITALS — BODY MASS INDEX: 33.75 KG/M2 | WEIGHT: 210 LBS | HEIGHT: 66 IN
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
## 2022-08-12 DIAGNOSIS — M79.672 LEFT FOOT PAIN: ICD-10-CM

## 2022-08-12 DIAGNOSIS — S93.602D SPRAIN OF LEFT FOOT, SUBSEQUENT ENCOUNTER: Primary | ICD-10-CM

## 2022-08-12 PROCEDURE — 99212 OFFICE O/P EST SF 10 MIN: CPT | Performed by: ORTHOPAEDIC SURGERY

## 2022-08-12 RX ORDER — METHYLPREDNISOLONE 4 MG/1
TABLET ORAL
Qty: 1 DOSE PACK | Refills: 0 | Status: SHIPPED | OUTPATIENT
Start: 2022-08-12 | End: 2022-08-24

## 2022-08-12 NOTE — PROGRESS NOTES
Benjamin Stuart (: 1985) is a 39 y.o. male, patient,here for evaluation of the following   Chief Complaint   Patient presents with    Foot Pain        ASSESSMENT/PLAN:  Below is the assessment and plan developed based on review of pertinent history, physical exam, labs, studies, and medications. 1. Sprain of left foot, subsequent encounter  -     methylPREDNISolone (MEDROL DOSEPACK) 4 mg tablet; Take 1 Tablet by mouth Specific Days and Specific Times for 6 days, THEN 1 Tablet Specific Days and Specific Times for 6 days. As instructed on packet, Normal, Disp-1 Dose Pack, R-0  2. Left foot pain  -     methylPREDNISolone (MEDROL DOSEPACK) 4 mg tablet; Take 1 Tablet by mouth Specific Days and Specific Times for 6 days, THEN 1 Tablet Specific Days and Specific Times for 6 days. As instructed on packet, Normal, Disp-1 Dose Pack, R-0  3. Work related injury    Patient informed of exam today. He is getting better he still has little bit of soreness however. He has a stable exam.  He will continue the same with comfortable shoes and sneakers or similar shoes that are useful for work until pain is less significant and there is less swelling. Activities as tolerated. Next time he returns no x-rays are needed. Anticipate full duty return without restrictions after next evaluation. Anticipate MMI. Work status report form completed for patient. Return in about 6 weeks (around 2022). Allergies   Allergen Reactions    Toradol [Ketorolac Tromethamine] Other (comments)     gastric reflux       Current Outpatient Medications   Medication Sig    methylPREDNISolone (MEDROL DOSEPACK) 4 mg tablet Take 1 Tablet by mouth Specific Days and Specific Times for 6 days, THEN 1 Tablet Specific Days and Specific Times for 6 days. As instructed on packet    OLANZapine (ZyPREXA) 5 mg tablet     SUMAtriptan (IMITREX) 100 mg tablet Take 100 mg by mouth once as needed for Migraine.     SUMAtriptan (Imitrex) 6 mg/0.5 mL injection 6 mg by SubCUTAneous route once. atorvastatin (LIPITOR) 80 mg tablet Take 40 mg by mouth daily. ondansetron (Zofran ODT) 8 mg disintegrating tablet Take 1 Tab by mouth every eight (8) hours as needed for Nausea or Vomiting.    escitalopram oxalate (LEXAPRO) 20 mg tablet Take 30 mg by mouth daily. baclofen (LIORESAL) 10 mg tablet Take  by mouth as needed. lidocaine (XYLOCAINE) 4 % topical cream Apply  to affected area two (2) times daily as needed for Pain. omeprazole (PRILOSEC) 20 mg capsule Take 40 mg by mouth daily. No current facility-administered medications for this visit.        Past Medical History:   Diagnosis Date    ADHD (attention deficit hyperactivity disorder)     Depression     Dyspepsia and other specified disorders of function of stomach     Epididymitis     Gastrointestinal disorder     GERD    Heat stroke     Musculoskeletal disorder        Past Surgical History:   Procedure Laterality Date    HX HEENT      right eye     HX VASECTOMY         Family History   Problem Relation Age of Onset    Hypertension Father     High Cholesterol Father     Diabetes Maternal Grandfather     Heart Disease Maternal Grandfather     Heart Attack Maternal Grandfather     Stroke Maternal Grandfather     Heart Attack Paternal Grandfather     Stroke Paternal Grandfather        Social History     Socioeconomic History    Marital status:      Spouse name: Not on file    Number of children: Not on file    Years of education: Not on file    Highest education level: Not on file   Occupational History    Not on file   Tobacco Use    Smoking status: Former     Packs/day: 0.50     Types: Cigarettes    Smokeless tobacco: Never    Tobacco comments:     quit 2018   Substance and Sexual Activity    Alcohol use: Yes     Comment: occassionally    Drug use: Yes     Types: Marijuana    Sexual activity: Yes     Partners: Female   Other Topics Concern    Not on file   Social History Narrative    Not on file     Social Determinants of Health     Financial Resource Strain: Not on file   Food Insecurity: Not on file   Transportation Needs: Not on file   Physical Activity: Not on file   Stress: Not on file   Social Connections: Not on file   Intimate Partner Violence: Not on file   Housing Stability: Not on file           Vitals:  Ht 5' 6\" (1.676 m)   Wt 210 lb (95.3 kg)   BMI 33.89 kg/m²    Body mass index is 33.89 kg/m². SUBJECTIVE:  Ling Adam (: 1985)   Patient is back for work-related injury sustained on May 2, 2022 which is a right foot sprain. Initially thought it was a Lisfranc sprain possible fractures he was sent for an MRI which did not show fractures. His Lisfranc was also intact. He is doing okay, he is back to some type of work at this point and the only thing is he continues to wear other types of shoes and not work shoes. He has no other complaints today. OBJECTIVE EXAM:     Visit Vitals  Ht 5' 6\" (1.676 m)   Wt 210 lb (95.3 kg)   BMI 33.89 kg/m²       Appearance: Alert, well appearing and pleasant patient who is in no distress, oriented to person, place/time, and who follows commands. This patient is accompanied in the       office by his  wife. Psychiatric: Affect and mood are appropriate. No dementia noted on examination  Musculoskeletal:  LOCATION: Mild diffuse tenderness to palpation ankle and foot- right      Integumentary: No rashes, skin patches, wounds, or abrasions to the right or left legs       Warm and normal color. No regions of expressible drainage.       Gait: Normal      Tenderness: No tenderness        Motor/Strength/Tone Exam: Normal       Sensory Exam:   Intact Normal Sensation to ankle/foot      Stability Testing: No anterolateral or varus instability of the Ankle or Subtalar Joints               No peroneal tendon instability noted      ROM: Normal ROM noted to ankle/foot      Contractures: No Achilles or Gastrocnemius Contractures      Calf tenderness: Absent for calf or gastrocnemius muscle regions       Soft, supple, non tender, non taut lower extremity compartment  Alignment:      NEUTRAL Hindfoot,         none Metatarsus Adductus Metatarsus   Wounds/Abrasions:    None present  Extremities:   No embolic phenomena to the toes          No significant edema to the foot and or toes. Lower extremities are warm and appear well perfused    DVT: No evidence of DVT seen on examination at this time     No calf swelling, no tenderness to calf muscles  Lymphatic:  No Evidence of Lymphedema  Vascular: Medial Border of Tibia Region: Edema is not present         Pulses: Dorsalis Pedis &  Posterior Tibial Pulses : Palpable yes        Varicosities Lower Limbs :  None  noted  Neuro: Negative bilateral Straight leg raise (seated position)    See Musculoskeletal section for pertinent individual extremity examination    No abnormal hand/wrist, foot/ankle, or facial/neck tremors. Lower Extremity/Ankle/Foot:  Mostly normal gait, satisfactory weightbearing stance. Right lower extremity/ankle: Mild tenderness ATFL, rest of ankles is nontender and stable on exam.  Ligaments grossly stable. Achilles tendon intact negative Oswald test.  Full active and passive range of motion intact. Right foot: Mild discomfort around midfoot but Lisfranc joint is stable nontender, rest of foot is nontender. No significant swelling, no malalignment or deformities, able to flex and extend toes satisfactory range of motion and strength. Contralateral lower extremity/ankle /foot exam:  Nontender, no swelling ligaments grossly stable. Normal weightbearing stance. Neurovascular exam remains intact for light touch sensation, capillary refill, dorsalis pedis pulse, strength 5/5 flexion and extension toes and ankle. Imaging:    No x-rays obtained today, previous studies have been normal x-rays at right foot and ankle.         An electronic signature was used to authenticate this note.   -- Brooke Ho MD

## 2022-08-12 NOTE — LETTER
8/15/2022    Patient: Tanisha Muniz   YOB: 1985   Date of Visit: 8/12/2022     Jem Hernadez MD  [phone]  Via Fax: [phone]    Dear Jem Hernadez MD,      Thank you for referring Mr. Joseph Jesus to [facility] for evaluation. My notes for this consultation are attached. If you have questions, please do not hesitate to call me. I look forward to following your patient along with you.       Sincerely,    Bubba Jones MD

## 2022-08-12 NOTE — LETTER
2022 8:43 AM    Mr. Zak Malik  [address] [address]          To Whom It May Concern:    Zak Malik is currently under the care of [facility]. Employee Information:  MRN: [id number]  : 1985    Information for Company/and or Employer:    Reason for Visit:  Follow up right foot sprain/injury  Date of Injury:  May 2, 2022  Diagnosis:  Right foot sprain, subsequent, right foot pain, work related injury. Follow up Requested:  6 weeks  Treatment: Continue use of comfortable shoes/sneakers or similar shoes (not work boots) until less swelling and pain. Work Status Restriction:  Work restrictions the same except no work boots, wear comfortable shoes. If there are questions or concerns please have the patient contact our office.     Sincerely,     Roz Callahan MD, [address] Orthopedic Surgeon   Foot and Ankle Subspecialist          Sincerely,      Roz Callahan MD

## 2022-11-18 ENCOUNTER — OFFICE VISIT (OUTPATIENT)
Dept: ORTHOPEDIC SURGERY | Age: 37
End: 2022-11-18
Payer: OTHER MISCELLANEOUS

## 2022-11-18 VITALS — BODY MASS INDEX: 33.75 KG/M2 | HEIGHT: 66 IN | WEIGHT: 210 LBS

## 2022-11-18 DIAGNOSIS — S93.601A RIGHT FOOT SPRAIN, INITIAL ENCOUNTER: Primary | ICD-10-CM
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
## 2022-11-18 PROCEDURE — 99213 OFFICE O/P EST LOW 20 MIN: CPT | Performed by: ORTHOPAEDIC SURGERY

## 2022-11-18 NOTE — LETTER
11/28/2022    Patient: Kristyn Hardin   YOB: 1985   Date of Visit: 11/18/2022     Kristen [address] [address]  Via Fax: [phone]    Dear Madeline Pineda MD,      Thank you for referring Mr. Ronak Oleary to [facility] for evaluation. My notes for this consultation are attached. If you have questions, please do not hesitate to call me. I look forward to following your patient along with you.       Sincerely,    Rosa Maria Lan MD

## 2022-11-18 NOTE — LETTER
2022 11:43 AM    Mr. Alysia Figueroa  [address] [address]       To Whom It May Concern:     Alysia Figueroa is currently under the care of [facility].     Employee Information:  MRN: [id number]  : 1985     Information for Company/and or Employer:     Reason for Visit:  Follow up right foot sprain/injury  Date of Injury:  May 2, 2022  Diagnosis:  Right foot sprain, subsequent, right foot pain, work related injury. Follow up Requested:  As needed. Treatment: Continue use of comfortable shoes/sneakers or similar shoes (not work boots) until less swelling and pain. Work Status Restriction:  Work restrictions the same except no work boots, wear comfortable shoes.   At [address] [place].     If there are questions or concerns please have the patient contact our office.     Sincerely,   Silvina Damon MD, [address] [facility]- Certified Orthopedic Surgeon   Foot and Ankle Subspecialist

## 2022-11-18 NOTE — PROGRESS NOTES
Feliz Boast (: 1985) is a 44 y.o. male, patient,here for evaluation of the following   Chief Complaint   Patient presents with    Foot Pain    Follow-up        ASSESSMENT/PLAN:  Below is the assessment and plan developed based on review of pertinent history, physical exam, labs, studies, and medications. 1. Right foot sprain, initial encounter  2. Work related injury    Patient is informed of findings on exam today. He does not have any swelling or ecchymosis, and most of his pain is at the forefoot plantar aspect, examination somewhat consistent with metatarsalgia. This not the area where he had injury which is more at the midfoot area. That area appears to be fully improved since last seen. I do recommend continued use of comfortable shoes/sneakers at work but not the work boots which do not appear to be very comfortable for him. In addition, use of a semirigid arch support with metatarsal padding could be helpful as well. Provided information on where to purchase these items. In terms of work restrictions, he is at full duty, the only exception is not to wear the work work boots at this time and to wear comfortable shoes with appropriate insoles. No current surgical indications, he is at [facility] [place] [address] today. Patient is fighting a work status report for employer. Return if symptoms worsen or fail to improve. Allergies   Allergen Reactions    Toradol [Ketorolac Tromethamine] Other (comments)     gastric reflux       Current Outpatient Medications   Medication Sig    OLANZapine (ZyPREXA) 5 mg tablet     SUMAtriptan (IMITREX) 100 mg tablet Take 100 mg by mouth once as needed for Migraine. SUMAtriptan (Imitrex) 6 mg/0.5 mL injection 6 mg by SubCUTAneous route once. atorvastatin (LIPITOR) 80 mg tablet Take 40 mg by mouth daily.     ondansetron (Zofran ODT) 8 mg disintegrating tablet Take 1 Tab by mouth every eight (8) hours as needed for Nausea or Vomiting.    escitalopram oxalate (LEXAPRO) 20 mg tablet Take 30 mg by mouth daily. baclofen (LIORESAL) 10 mg tablet Take  by mouth as needed. lidocaine (XYLOCAINE) 4 % topical cream Apply  to affected area two (2) times daily as needed for Pain. omeprazole (PRILOSEC) 20 mg capsule Take 40 mg by mouth daily. No current facility-administered medications for this visit.        Past Medical History:   Diagnosis Date    ADHD (attention deficit hyperactivity disorder)     Depression     Dyspepsia and other specified disorders of function of stomach     Epididymitis     Gastrointestinal disorder     GERD    Heat stroke     Musculoskeletal disorder        Past Surgical History:   Procedure Laterality Date    HX HEENT      right eye     HX VASECTOMY         Family History   Problem Relation Age of Onset    Hypertension Father     High Cholesterol Father     Diabetes Maternal Grandfather     Heart Disease Maternal Grandfather     Heart Attack Maternal Grandfather     Stroke Maternal Grandfather     Heart Attack Paternal Grandfather     Stroke Paternal Grandfather        Social History     Socioeconomic History    Marital status:      Spouse name: Not on file    Number of children: Not on file    Years of education: Not on file    Highest education level: Not on file   Occupational History    Not on file   Tobacco Use    Smoking status: Former     Packs/day: 0.50     Types: Cigarettes    Smokeless tobacco: Never    Tobacco comments:     quit 2018   Substance and Sexual Activity    Alcohol use: Yes     Comment: occassionally    Drug use: Yes     Types: Marijuana    Sexual activity: Yes     Partners: Female   Other Topics Concern    Not on file   Social History Narrative    Not on file     Social Determinants of Health     Financial Resource Strain: Not on file   Food Insecurity: Not on file   Transportation Needs: Not on file   Physical Activity: Not on file   Stress: Not on file   Social Connections: Not on file   Intimate Partner Violence: Not on file Housing Stability: Not on file           Vitals:  Ht 5' 6\" (1.676 m)   Wt 210 lb (95.3 kg)   BMI 33.89 kg/m²    Body mass index is 33.89 kg/m². SUBJECTIVE:  Charlene Weiner (: 1985)   Worker's Comp. patient returns today for reevaluation of right foot injury which was diagnosed as a right foot sprain and sustained on May 2, 2022. Overall he has had improvements but continues to have some discomfort. We have obtain other studies in recent past such as an MRI which did not show anything other than deep bruise to the bone and a sprain. He did not have any fractures. He is back to work and his foot still bothers him especially at the forefoot. No other injuries recently. OBJECTIVE EXAM:     Visit Vitals  Ht 5' 6\" (1.676 m)   Wt 210 lb (95.3 kg)   BMI 33.89 kg/m²       Appearance: Alert, well appearing and pleasant patient who is in no distress, oriented to person, place/time, and who follows commands. This patient is accompanied in the       office by his  self. Psychiatric: Affect and mood are appropriate. No dementia noted on examination  Musculoskeletal:  LOCATION: Mild discomfort deep palpation second third metatarsal head and plantar plate area forefoot foot - right      Integumentary: No rashes, skin patches, wounds, or abrasions to the right or left legs       Warm and normal color. No regions of expressible drainage.       Gait: Normal      Tenderness: No tenderness        Motor/Strength/Tone Exam: Normal       Sensory Exam:   Intact Normal Sensation to ankle/foot      Stability Testing: No anterolateral or varus instability of the Ankle or Subtalar Joints               No peroneal tendon instability noted      ROM: Normal ROM noted to ankle/foot      Contractures: No Achilles or Gastrocnemius Contractures      Calf tenderness: Absent for calf or gastrocnemius muscle regions       Soft, supple, non tender, non taut lower extremity compartment  Alignment:      NEUTRAL Hindfoot,         none Metatarsus Adductus Metatarsus   Wounds/Abrasions:    None present  Extremities:   No embolic phenomena to the toes          No significant edema to the foot and or toes. Lower extremities are warm and appear well perfused    DVT: No evidence of DVT seen on examination at this time     No calf swelling, no tenderness to calf muscles  Lymphatic:  No Evidence of Lymphedema  Vascular: Medial Border of Tibia Region: Edema is not present         Pulses: Dorsalis Pedis &  Posterior Tibial Pulses : Palpable yes        Varicosities Lower Limbs :  None  noted  Neuro: Negative bilateral Straight leg raise (seated position)    See Musculoskeletal section for pertinent individual extremity examination    No abnormal hand/wrist, foot/ankle, or facial/neck tremors. Lower Extremity/Ankle/Foot:  Mostly normal gait, normal weightbearing stance. Right lower extremity/ankle: Full active and passive range of motion intact, nontender, swelling, normal exam.    Right foot: There is tenderness under the forefoot second, third and fourth metatarsal heads, no swelling, no ecchymosis, no erythema, no fluctuance. The area of injury is mostly nontender that is more towards the tarsal bones. No malalignment or deformity to the foot. Contralateral lower extremity/ankle /foot exam:  Nontender, no swelling ligaments grossly stable. Normal weightbearing stance. Neurovascular exam grossly intact. Imaging:    No x-rays were obtained, previous x-rays have been normal and also previous MRI has not shown fracture or dislocation. An electronic signature was used to authenticate this note.   -- Maria E Shell MD

## 2022-11-18 NOTE — LETTER
11/18/2022 12:06 PM    Mr. Boothe Net  [address] [address]              Sincerely,      Ric Harrison MD

## 2025-06-02 ENCOUNTER — OFFICE VISIT (OUTPATIENT)
Age: 40
End: 2025-06-02

## 2025-06-02 VITALS
TEMPERATURE: 98.6 F | RESPIRATION RATE: 16 BRPM | OXYGEN SATURATION: 98 % | BODY MASS INDEX: 30.37 KG/M2 | SYSTOLIC BLOOD PRESSURE: 106 MMHG | WEIGHT: 189 LBS | HEART RATE: 66 BPM | HEIGHT: 66 IN | DIASTOLIC BLOOD PRESSURE: 65 MMHG

## 2025-06-02 DIAGNOSIS — L02.91 ABSCESS: Primary | ICD-10-CM

## 2025-06-02 RX ORDER — MUPIROCIN 20 MG/G
OINTMENT TOPICAL 2 TIMES DAILY
Qty: 1 EACH | Refills: 0 | Status: SHIPPED | OUTPATIENT
Start: 2025-06-02 | End: 2025-06-09

## 2025-06-02 RX ORDER — DOXYCYCLINE HYCLATE 100 MG
100 TABLET ORAL 2 TIMES DAILY
Qty: 14 TABLET | Refills: 0 | Status: SHIPPED | OUTPATIENT
Start: 2025-06-02 | End: 2025-06-09

## 2025-06-02 ASSESSMENT — ENCOUNTER SYMPTOMS
RESPIRATORY NEGATIVE: 1
EYES NEGATIVE: 1
ALLERGIC/IMMUNOLOGIC NEGATIVE: 1
GASTROINTESTINAL NEGATIVE: 1

## 2025-06-02 NOTE — PATIENT INSTRUCTIONS
Thank you for visiting [facility] today.    You can purchase Hypochlorous Acid Cleanser spray for areas with increased sweat/folds    Take oral antibiotics on a full stomach until you complete the entire prescription.   Take a probiotic while you are using the antibiotic.    Elevate the area - elevating the arm or leg above the level of the heart can help to reduce swelling and speed healing.  Keep the area clean and dry - it is important to keep the infected area clean and dry.   You can shower or bathe normally and pat the area dry with a clean towel.  You can use a bandage or gauze to protect the skin if needed.  You may use warm, moist compresses for pain relief.  Antibiotics - Most people with cellulitis are treated with an antibiotic that is taken by mouth for 5 to 14 days.   It is important to take the antibiotic exactly as recommended and to finish the entire course of treatment.  Skipping doses or ending treatment early could potentially allow the bacteria to become resistant and require longer treatment or permit the infection to worse after initial improvement.  Time to heal - Resolution of fever and chills, if they were initially present, should occur within one to two days after starting antibiotic therapy.  Local findings of swelling, warmth, and redness should begin to improve within one to three days after starting antibiotics, although these symptoms can persist for two weeks.  If the reddened area becomes larger, more swollen, or more tender, call your healthcare provider.  He or she may want to re-examine you to determine if further testing or an alternate antibiotic is needed.      Please follow up with your PCP within 2 days if your signs and symptoms have not resolved or worsened.    Please go immediately to the ED if you develop fever of 100.4F or above, chills, vomiting, worsening redness/pain/swelling to affected area, red streaks, and/or no improvement after 48 hours of oral

## 2025-06-02 NOTE — PROGRESS NOTES
2025   Mauro Villavicencio (: 1985) is a 39 y.o. male, patient, here for evaluation of the following chief complaint(s):  Mass (Pt presents in office today with red, itchy, growing bump on right side of armpit. Pt states he he apply alcohol on bump. Sx onset 1week prior to visit today. Sx have not subsided.)       SUBJECTIVE/OBJECTIVE:    Mass         Mass (Pt presents in office today with red, itchy, growing bump on right side of armpit. Pt states he he apply alcohol on bump. Sx onset 1week prior to visit today. Sx have not subsided.)        Review of Systems   Constitutional: Negative.    HENT: Negative.     Eyes: Negative.    Respiratory: Negative.     Cardiovascular: Negative.    Gastrointestinal: Negative.    Endocrine: Negative.    Genitourinary: Negative.    Musculoskeletal: Negative.    Skin:  Positive for wound.   Allergic/Immunologic: Negative.    Neurological: Negative.    Hematological: Negative.    Psychiatric/Behavioral: Negative.     All other systems reviewed and are negative.        Physical Exam  Vitals and nursing note reviewed.   Constitutional:       General: He is not in acute distress.     Appearance: Normal appearance. He is not toxic-appearing.   HENT:      Head: Normocephalic.      Right Ear: Tympanic membrane normal.      Left Ear: Tympanic membrane normal.      Nose: Nose normal.      Mouth/Throat:      Mouth: Mucous membranes are moist.      Pharynx: Oropharynx is clear.   Eyes:      Pupils: Pupils are equal, round, and reactive to light.   Cardiovascular:      Rate and Rhythm: Normal rate and regular rhythm.   Pulmonary:      Effort: No respiratory distress.      Breath sounds: Normal breath sounds. No stridor. No wheezing, rhonchi or rales.   Abdominal:      General: There is no distension.      Palpations: Abdomen is soft.      Tenderness: There is no abdominal tenderness. There is no right CVA tenderness, left CVA tenderness, guarding or rebound.